# Patient Record
Sex: FEMALE | Race: WHITE | NOT HISPANIC OR LATINO | Employment: FULL TIME | URBAN - METROPOLITAN AREA
[De-identification: names, ages, dates, MRNs, and addresses within clinical notes are randomized per-mention and may not be internally consistent; named-entity substitution may affect disease eponyms.]

---

## 2017-01-04 ENCOUNTER — TELEPHONE (OUTPATIENT)
Dept: FAMILY MEDICINE | Facility: CLINIC | Age: 22
End: 2017-01-04

## 2017-01-04 NOTE — TELEPHONE ENCOUNTER
----- Message from Tonny Funk sent at 1/4/2017  3:03 PM CST -----  Contact: Patient   Patient called to request orders be created for labs - please create orders and call patient at [phone] to schedule.  [place] and [place] labs to return to work

## 2017-01-06 ENCOUNTER — OFFICE VISIT (OUTPATIENT)
Dept: FAMILY MEDICINE | Facility: CLINIC | Age: 22
End: 2017-01-06
Payer: COMMERCIAL

## 2017-01-06 ENCOUNTER — LAB VISIT (OUTPATIENT)
Dept: LAB | Facility: HOSPITAL | Age: 22
End: 2017-01-06
Attending: NURSE PRACTITIONER
Payer: COMMERCIAL

## 2017-01-06 VITALS
TEMPERATURE: 98 F | HEIGHT: 69 IN | BODY MASS INDEX: 28.9 KG/M2 | HEART RATE: 74 BPM | SYSTOLIC BLOOD PRESSURE: 126 MMHG | WEIGHT: 195.13 LBS | DIASTOLIC BLOOD PRESSURE: 70 MMHG

## 2017-01-06 DIAGNOSIS — Z00.00 ROUTINE GENERAL MEDICAL EXAMINATION AT A HEALTH CARE FACILITY: ICD-10-CM

## 2017-01-06 DIAGNOSIS — Z00.00 ROUTINE GENERAL MEDICAL EXAMINATION AT A HEALTH CARE FACILITY: Primary | ICD-10-CM

## 2017-01-06 LAB
BASOPHILS # BLD AUTO: 0.02 K/UL
BASOPHILS NFR BLD: 0.2 %
DIFFERENTIAL METHOD: ABNORMAL
EOSINOPHIL # BLD AUTO: 0 K/UL
EOSINOPHIL NFR BLD: 0.5 %
ERYTHROCYTE [DISTWIDTH] IN BLOOD BY AUTOMATED COUNT: 14.2 %
HCT VFR BLD AUTO: 40.3 %
HGB BLD-MCNC: 13 G/DL
LYMPHOCYTES # BLD AUTO: 2.2 K/UL
LYMPHOCYTES NFR BLD: 26.7 %
MCH RBC QN AUTO: 24.8 PG
MCHC RBC AUTO-ENTMCNC: 32.3 %
MCV RBC AUTO: 77 FL
MONOCYTES # BLD AUTO: 0.7 K/UL
MONOCYTES NFR BLD: 8.7 %
NEUTROPHILS # BLD AUTO: 5.3 K/UL
NEUTROPHILS NFR BLD: 63.8 %
PLATELET # BLD AUTO: 320 K/UL
PMV BLD AUTO: 10.3 FL
RBC # BLD AUTO: 5.25 M/UL
WBC # BLD AUTO: 8.27 K/UL

## 2017-01-06 PROCEDURE — 1159F MED LIST DOCD IN RCRD: CPT | Mod: S$GLB,,, | Performed by: NURSE PRACTITIONER

## 2017-01-06 PROCEDURE — 99999 PR PBB SHADOW E&M-EST. PATIENT-LVL III: CPT | Mod: PBBFAC,,, | Performed by: NURSE PRACTITIONER

## 2017-01-06 PROCEDURE — 99214 OFFICE O/P EST MOD 30 MIN: CPT | Mod: S$GLB,,, | Performed by: NURSE PRACTITIONER

## 2017-01-06 PROCEDURE — 36415 COLL VENOUS BLD VENIPUNCTURE: CPT | Mod: PO

## 2017-01-06 PROCEDURE — 85025 COMPLETE CBC W/AUTO DIFF WBC: CPT

## 2017-01-06 RX ORDER — SULFAMETHOXAZOLE AND TRIMETHOPRIM 800; 160 MG/1; MG/1
TABLET ORAL
Refills: 0 | COMMUNITY
Start: 2016-11-02 | End: 2017-01-06

## 2017-01-06 RX ORDER — METRONIDAZOLE 500 MG/1
TABLET ORAL
Refills: 0 | COMMUNITY
Start: 2016-11-02 | End: 2017-01-06

## 2017-01-06 NOTE — PROGRESS NOTES
Subjective:       Patient ID: Claudia Schaefer is a 21 y.o. female.    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 Was out of country xs 10 days, has been back since Wednesday. No c/o today.    Vitals:    01/06/17 1327   BP: 126/70   Pulse: 74   Temp: 98 °F (36.7 °C)     Review of Systems   Constitutional: Negative for chills, diaphoresis and fever.   HENT: Positive for rhinorrhea. Negative for ear discharge, ear pain, facial swelling, postnasal drip, sinus pressure, sore throat and trouble swallowing.    Eyes: Negative for pain, discharge, redness and itching.   Respiratory: Positive for cough (mild, non productive cough). Negative for shortness of breath and wheezing.    Cardiovascular: Negative for chest pain, palpitations and leg swelling.   Gastrointestinal: Negative for abdominal pain, blood in stool, diarrhea, nausea and vomiting.   Endocrine: Negative for cold intolerance, heat intolerance, polydipsia and polyuria.        Has a swollen lymph node to left cervical area, unchanged, has had a previous biopsy was WNL at time of biopsy, dx with enlarged lymph node   Genitourinary: Negative for dysuria, frequency, genital sores, hematuria, pelvic pain, vaginal bleeding, vaginal discharge and vaginal pain.   Musculoskeletal: Negative.  Negative for arthralgias.   Skin: Negative for color change and rash.   Neurological: Negative for dizziness, tremors, speech difficulty, weakness and light-headedness.       Past Medical History   Diagnosis Date    Allergy     Anxiety     Former smoker     Otitis media      Objective:      Physical Exam   Constitutional: She is oriented to person, place, and time. Vital signs are normal. She appears well-developed and well-nourished. She is active and cooperative. She does not have a sickly appearance. She does not appear ill.   HENT:   Head: Normocephalic.    Right Ear: Tympanic membrane, external ear and ear canal normal.   Left Ear: Tympanic membrane, external ear and ear canal normal.   Nose: Mucosal edema and rhinorrhea present. Right sinus exhibits no maxillary sinus tenderness and no frontal sinus tenderness. Left sinus exhibits no maxillary sinus tenderness and no frontal sinus tenderness.   Mouth/Throat: Oropharynx is clear and moist and mucous membranes are normal.   Eyes: Conjunctivae and EOM are normal. Pupils are equal, round, and reactive to light.   Neck: Neck supple. Carotid bruit is not present. No thyroid mass and no thyromegaly present.   Cardiovascular: Normal rate, regular rhythm, S1 normal, S2 normal, normal heart sounds and intact distal pulses.    No murmur heard.  Pulses:       Dorsalis pedis pulses are 2+ on the right side, and 2+ on the left side.        Posterior tibial pulses are 2+ on the right side, and 2+ on the left side.   No edema oz   Pulmonary/Chest: Effort normal and breath sounds normal.   Abdominal: Soft. Bowel sounds are normal. She exhibits no abdominal bruit. There is no hepatosplenomegaly. There is no tenderness.   Musculoskeletal: Normal range of motion.   Lymphadenopathy:     She has no cervical adenopathy.     She has no axillary adenopathy.        Right: No inguinal and no supraclavicular adenopathy present.        Left: No inguinal and no supraclavicular adenopathy present.   Neurological: She is alert and oriented to person, place, and time.   Skin: Skin is warm, dry and intact.   Psychiatric: She has a normal mood and affect.   Nursing note and vitals reviewed.      Assessment:       1. Routine general medical examination at a health care facility        Plan:       Routine general medical examination at a health care facility  -     CBC auto differential; Future; Expected date: 1/6/17        will have her do CBC today, if WNL, will write note for her to return to work, if any concerning sxs develop RTC    Return if  symptoms worsen or fail to improve, for pending test results.

## 2017-01-06 NOTE — MR AVS SNAPSHOT
[facility]  [address] [facility] [address]  [place] [address]  Phone: [phone]  Fax: [phone]                  Claudia Schaefer   2017 1:20 PM   Office Visit    Description:  Female : 1995   Provider:  YUSRA Pate   Department:  [facility]           Reason for Visit     clearance to return to work           Diagnoses this Visit        Comments    Routine general medical examination at a health care facility    -  Primary            To Do List           Goals (5 Years of Data)     None      Follow-Up and Disposition     Return if symptoms worsen or fail to improve, for pending test results.      [facility] On Call     [facility] On Call Nurse Care Line -  Assistance  Registered nurses in the [facility] On Call Center provide clinical advisement, health education, appointment booking, and other advisory services.  Call for this free service at [phone].             Medications           Message regarding Medications     Verify the changes and/or additions to your medication regime listed below are the same as discussed with your clinician today.  If any of these changes or additions are incorrect, please notify your healthcare provider.        STOP taking these medications     esomeprazole (NEXIUM) 40 MG capsule Take 1 capsule (40 mg total) by mouth before breakfast.    metronidazole (FLAGYL) 500 MG tablet TK 1 T PO TWICE DAILY. DO NOT CONSUME ALCOHOL WHILE TAKING MEDICATION    sulfamethoxazole-trimethoprim 800-160mg (BACTRIM DS) 800-160 mg Tab TK 1 T PO BID           Verify that the below list of medications is an accurate representation of the medications you are currently taking.  If none reported, the list may be blank. If incorrect, please contact your healthcare provider. Carry this list with you in case of emergency.           Current Medications     escitalopram oxalate (LEXAPRO) 10 MG tablet Take 10 mg by mouth every evening. As needed for stress     "cholecalciferol, vitamin D3, 2,000 unit Cap            Clinical Reference Information           Vital Signs - Last Recorded  Most recent update: 1/6/2017  1:29 PM by Rufina Rosenthal MA    BP Pulse Temp Ht Wt BMI    126/70 (BP Location: Right arm, Patient Position: Sitting, BP Method: Manual) 74 98 °F (36.7 °C) (Oral) 5' 9" (1.753 m) 88.5 kg (195 lb 1.7 oz) 28.81 kg/m2      Blood Pressure          Most Recent Value    BP  126/70      Allergies as of 1/6/2017     No Known Allergies      Immunizations Administered on Date of Encounter - 1/6/2017     None      Orders Placed During Today's Visit     Future Labs/Procedures Expected by Expires    CBC auto differential  1/6/2017 3/7/2018      "

## 2017-03-24 ENCOUNTER — PATIENT MESSAGE (OUTPATIENT)
Dept: FAMILY MEDICINE | Facility: CLINIC | Age: 22
End: 2017-03-24

## 2018-01-04 ENCOUNTER — TELEPHONE (OUTPATIENT)
Dept: OTOLARYNGOLOGY | Facility: CLINIC | Age: 23
End: 2018-01-04

## 2018-01-04 NOTE — TELEPHONE ENCOUNTER
----- Message from Maryuri Davis sent at 1/4/2018  9:38 AM CST -----  Contact: Patient  Claudia, patient [phone] calling for same day appointment, patient believes she has tonsil stones and states it is hard to swallow. Please advise. Thanks.

## 2018-01-05 ENCOUNTER — OFFICE VISIT (OUTPATIENT)
Dept: OTOLARYNGOLOGY | Facility: CLINIC | Age: 23
End: 2018-01-05
Payer: COMMERCIAL

## 2018-01-05 VITALS
HEART RATE: 72 BPM | SYSTOLIC BLOOD PRESSURE: 114 MMHG | BODY MASS INDEX: 28.16 KG/M2 | HEIGHT: 67 IN | DIASTOLIC BLOOD PRESSURE: 71 MMHG | WEIGHT: 179.44 LBS

## 2018-01-05 DIAGNOSIS — J03.91 RECURRENT TONSILLITIS: ICD-10-CM

## 2018-01-05 DIAGNOSIS — J35.8 CRYPTIC TONSIL: Primary | ICD-10-CM

## 2018-01-05 DIAGNOSIS — J35.8 TONSILLOLITH: ICD-10-CM

## 2018-01-05 PROCEDURE — 99213 OFFICE O/P EST LOW 20 MIN: CPT | Mod: S$GLB,,, | Performed by: NURSE PRACTITIONER

## 2018-01-05 PROCEDURE — 99999 PR PBB SHADOW E&M-EST. PATIENT-LVL III: CPT | Mod: PBBFAC,,, | Performed by: NURSE PRACTITIONER

## 2018-01-05 RX ORDER — NORETHINDRONE ACETATE AND ETHINYL ESTRADIOL .5; 2.5 MG/1; UG/1
1 TABLET ORAL DAILY
COMMUNITY
End: 2018-02-20

## 2018-01-05 NOTE — PROGRESS NOTES
Subjective:       Patient ID: Claudia Schaefer is a 22 y.o. female.    Chief Complaint: tonsil stones (x 3 months); Sore Throat; and Dysphagia    HPI   Patient states she has had 2 episodes of strep/tonsillitis in the past year, both treated at urgent care.  She reports history of tonsil stones.  She reports dysphagia and globus sensation X 3 weeks. She has a swollen gland left neck X 1 year.     Review of Systems   Constitutional: Negative.    HENT: Positive for sore throat and trouble swallowing.    Eyes: Negative.    Respiratory: Negative.    Cardiovascular: Negative.    Gastrointestinal: Negative.    Musculoskeletal: Negative.    Skin: Negative.    Neurological: Negative.    Hematological: Negative.    Psychiatric/Behavioral: Negative.        Objective:      Physical Exam   Constitutional: She is oriented to person, place, and time. Vital signs are normal. She appears well-developed and well-nourished. She is cooperative. She does not appear ill. No distress.   HENT:   Head: Normocephalic and atraumatic.   Right Ear: Hearing, tympanic membrane, external ear and ear canal normal. Tympanic membrane is not erythematous. No middle ear effusion.   Left Ear: Hearing, tympanic membrane, external ear and ear canal normal. Tympanic membrane is not erythematous.  No middle ear effusion.   Nose: Nose normal. No mucosal edema or rhinorrhea.   Mouth/Throat: Uvula is midline, oropharynx is clear and moist and mucous membranes are normal. Mucous membranes are not pale, not dry and not cyanotic. No oral lesions. No oropharyngeal exudate, posterior oropharyngeal edema or posterior oropharyngeal erythema. Tonsils are 2+ on the right. Tonsils are 2+ on the left. No tonsillar exudate.   Eyes: Conjunctivae, EOM and lids are normal. Pupils are equal, round, and reactive to light. Right eye exhibits no discharge. Left eye exhibits no discharge. No scleral icterus.   Neck: Trachea normal and normal range of motion. Neck supple. No  tracheal deviation present. No thyroid mass and no thyromegaly present.   Cardiovascular: Normal rate.    Pulmonary/Chest: Effort normal. No stridor. No respiratory distress. She has no wheezes.   Musculoskeletal: Normal range of motion.   Lymphadenopathy:        Head (right side): No submental, no submandibular, no tonsillar, no preauricular and no posterior auricular adenopathy present.        Head (left side): No submental, no submandibular, no tonsillar, no preauricular and no posterior auricular adenopathy present.     She has no cervical adenopathy.        Right cervical: No superficial cervical and no posterior cervical adenopathy present.       Left cervical: No superficial cervical and no posterior cervical adenopathy present.   Neurological: She is alert and oriented to person, place, and time. She has normal strength. Coordination and gait normal.   Skin: Skin is warm, dry and intact. No lesion and no rash noted. She is not diaphoretic. No cyanosis. No pallor.   Psychiatric: She has a normal mood and affect. Her speech is normal and behavior is normal. Judgment and thought content normal. Cognition and memory are normal.   Nursing note and vitals reviewed.      Assessment:     Cryptic tonsils with h/o Tonsilloliths      Plan:     Discussed criteria for tonsillectomy. Discussed measures for tonsilloliths.     Patient encouraged to return to ENT for further episodes of acute tonsillitis, or any other ENT symptoms or concerns

## 2018-01-24 PROBLEM — J35.03 TONSILLITIS AND ADENOIDITIS, CHRONIC: Status: ACTIVE | Noted: 2018-01-24

## 2018-01-24 PROBLEM — J35.3 HYPERPLASIA OF TONSILS AND ADENOIDS: Status: ACTIVE | Noted: 2018-01-24

## 2018-04-20 PROBLEM — O00.80 OTHER ECTOPIC PREGNANCY WITHOUT INTRAUTERINE PREGNANCY: Status: ACTIVE | Noted: 2018-04-20

## 2019-08-16 LAB — PAP SMEAR: NORMAL

## 2019-09-23 ENCOUNTER — TELEPHONE (OUTPATIENT)
Dept: NEUROLOGY | Facility: CLINIC | Age: 24
End: 2019-09-23

## 2019-09-23 NOTE — TELEPHONE ENCOUNTER
Spoke with pt and informed her Dr. Dumas will not be seeing patients in clinic after this month and of no available appts at this time. Informed a referral is needed to be seen by providers. Pt upset that a referral is needed and will check elsewhere. Verbalized understanding.

## 2019-09-23 NOTE — TELEPHONE ENCOUNTER
----- Message from Elvia Rutledge sent at 9/23/2019 10:40 AM CDT -----  Contact: self  Type:  Same Day Appointment Request    Patient request Dr Dumas states has Chiari need appointnment    Name of Caller:   Patient   When is the first available appointment?  Symptoms:  Best Call Back Number:[phone]  Additional Information:  I was not sure of appointment

## 2020-01-09 ENCOUNTER — OFFICE VISIT (OUTPATIENT)
Dept: FAMILY MEDICINE | Facility: CLINIC | Age: 25
End: 2020-01-09
Payer: COMMERCIAL

## 2020-01-09 ENCOUNTER — TELEPHONE (OUTPATIENT)
Dept: ADMINISTRATIVE | Facility: HOSPITAL | Age: 25
End: 2020-01-09

## 2020-01-09 ENCOUNTER — LAB VISIT (OUTPATIENT)
Dept: LAB | Facility: HOSPITAL | Age: 25
End: 2020-01-09
Attending: FAMILY MEDICINE
Payer: COMMERCIAL

## 2020-01-09 VITALS
SYSTOLIC BLOOD PRESSURE: 104 MMHG | HEART RATE: 71 BPM | DIASTOLIC BLOOD PRESSURE: 62 MMHG | HEIGHT: 67 IN | WEIGHT: 159.63 LBS | OXYGEN SATURATION: 97 % | TEMPERATURE: 98 F | BODY MASS INDEX: 25.06 KG/M2

## 2020-01-09 DIAGNOSIS — K21.9 GASTROESOPHAGEAL REFLUX DISEASE WITHOUT ESOPHAGITIS: ICD-10-CM

## 2020-01-09 DIAGNOSIS — R53.83 OTHER FATIGUE: ICD-10-CM

## 2020-01-09 DIAGNOSIS — Z00.01 ENCOUNTER FOR ROUTINE ADULT HEALTH EXAMINATION WITH ABNORMAL FINDINGS: ICD-10-CM

## 2020-01-09 DIAGNOSIS — Z00.01 ENCOUNTER FOR ROUTINE ADULT HEALTH EXAMINATION WITH ABNORMAL FINDINGS: Primary | ICD-10-CM

## 2020-01-09 DIAGNOSIS — R01.1 HEART MURMUR: ICD-10-CM

## 2020-01-09 LAB
ALBUMIN SERPL BCP-MCNC: 4.1 G/DL (ref 3.5–5.2)
ALP SERPL-CCNC: 69 U/L (ref 55–135)
ALT SERPL W/O P-5'-P-CCNC: 10 U/L (ref 10–44)
ANION GAP SERPL CALC-SCNC: 7 MMOL/L (ref 8–16)
AST SERPL-CCNC: 16 U/L (ref 10–40)
BASOPHILS # BLD AUTO: 0.02 K/UL (ref 0–0.2)
BASOPHILS NFR BLD: 0.5 % (ref 0–1.9)
BILIRUB SERPL-MCNC: 0.8 MG/DL (ref 0.1–1)
BUN SERPL-MCNC: 11 MG/DL (ref 6–20)
CALCIUM SERPL-MCNC: 9.4 MG/DL (ref 8.7–10.5)
CHLORIDE SERPL-SCNC: 107 MMOL/L (ref 95–110)
CHOLEST SERPL-MCNC: 150 MG/DL (ref 120–199)
CHOLEST/HDLC SERPL: 2.3 {RATIO} (ref 2–5)
CO2 SERPL-SCNC: 27 MMOL/L (ref 23–29)
CREAT SERPL-MCNC: 0.7 MG/DL (ref 0.5–1.4)
DIFFERENTIAL METHOD: ABNORMAL
EOSINOPHIL # BLD AUTO: 0 K/UL (ref 0–0.5)
EOSINOPHIL NFR BLD: 0.5 % (ref 0–8)
ERYTHROCYTE [DISTWIDTH] IN BLOOD BY AUTOMATED COUNT: 14 % (ref 11.5–14.5)
EST. GFR  (AFRICAN AMERICAN): >60 ML/MIN/1.73 M^2
EST. GFR  (NON AFRICAN AMERICAN): >60 ML/MIN/1.73 M^2
ESTIMATED AVG GLUCOSE: 94 MG/DL (ref 68–131)
GLUCOSE SERPL-MCNC: 73 MG/DL (ref 70–110)
HBA1C MFR BLD HPLC: 4.9 % (ref 4–5.6)
HCT VFR BLD AUTO: 41.6 % (ref 37–48.5)
HDLC SERPL-MCNC: 65 MG/DL (ref 40–75)
HDLC SERPL: 43.3 % (ref 20–50)
HGB BLD-MCNC: 13.3 G/DL (ref 12–16)
IMM GRANULOCYTES # BLD AUTO: 0 K/UL (ref 0–0.04)
IMM GRANULOCYTES NFR BLD AUTO: 0 % (ref 0–0.5)
LDLC SERPL CALC-MCNC: 65.4 MG/DL (ref 63–159)
LYMPHOCYTES # BLD AUTO: 0.7 K/UL (ref 1–4.8)
LYMPHOCYTES NFR BLD: 18.4 % (ref 18–48)
MCH RBC QN AUTO: 27.2 PG (ref 27–31)
MCHC RBC AUTO-ENTMCNC: 32 G/DL (ref 32–36)
MCV RBC AUTO: 85 FL (ref 82–98)
MONOCYTES # BLD AUTO: 0.5 K/UL (ref 0.3–1)
MONOCYTES NFR BLD: 13.6 % (ref 4–15)
NEUTROPHILS # BLD AUTO: 2.5 K/UL (ref 1.8–7.7)
NEUTROPHILS NFR BLD: 67 % (ref 38–73)
NONHDLC SERPL-MCNC: 85 MG/DL
NRBC BLD-RTO: 0 /100 WBC
PLATELET # BLD AUTO: 238 K/UL (ref 150–350)
PMV BLD AUTO: 11 FL (ref 9.2–12.9)
POTASSIUM SERPL-SCNC: 3.6 MMOL/L (ref 3.5–5.1)
PROT SERPL-MCNC: 7.4 G/DL (ref 6–8.4)
RBC # BLD AUTO: 4.89 M/UL (ref 4–5.4)
SODIUM SERPL-SCNC: 141 MMOL/L (ref 136–145)
TRIGL SERPL-MCNC: 98 MG/DL (ref 30–150)
TSH SERPL DL<=0.005 MIU/L-ACNC: 1.33 UIU/ML (ref 0.4–4)
WBC # BLD AUTO: 3.75 K/UL (ref 3.9–12.7)

## 2020-01-09 PROCEDURE — 83036 HEMOGLOBIN GLYCOSYLATED A1C: CPT

## 2020-01-09 PROCEDURE — 99385 PR PREVENTIVE VISIT,NEW,18-39: ICD-10-PCS | Mod: S$GLB,,, | Performed by: FAMILY MEDICINE

## 2020-01-09 PROCEDURE — 99999 PR PBB SHADOW E&M-EST. PATIENT-LVL IV: ICD-10-PCS | Mod: PBBFAC,,, | Performed by: FAMILY MEDICINE

## 2020-01-09 PROCEDURE — 85025 COMPLETE CBC W/AUTO DIFF WBC: CPT

## 2020-01-09 PROCEDURE — 36415 COLL VENOUS BLD VENIPUNCTURE: CPT | Mod: PO

## 2020-01-09 PROCEDURE — 84443 ASSAY THYROID STIM HORMONE: CPT

## 2020-01-09 PROCEDURE — 99385 PREV VISIT NEW AGE 18-39: CPT | Mod: S$GLB,,, | Performed by: FAMILY MEDICINE

## 2020-01-09 PROCEDURE — 99999 PR PBB SHADOW E&M-EST. PATIENT-LVL IV: CPT | Mod: PBBFAC,,, | Performed by: FAMILY MEDICINE

## 2020-01-09 PROCEDURE — 80061 LIPID PANEL: CPT

## 2020-01-09 PROCEDURE — 80053 COMPREHEN METABOLIC PANEL: CPT

## 2020-01-09 RX ORDER — BUTALBITAL, ACETAMINOPHEN AND CAFFEINE 50; 325; 40 MG/1; MG/1; MG/1
1 TABLET ORAL EVERY 8 HOURS PRN
COMMUNITY
Start: 2019-08-01 | End: 2020-03-20 | Stop reason: SDUPTHER

## 2020-01-09 RX ORDER — PANTOPRAZOLE SODIUM 20 MG/1
20 TABLET, DELAYED RELEASE ORAL DAILY
Qty: 30 TABLET | Refills: 1 | Status: SHIPPED | OUTPATIENT
Start: 2020-01-09 | End: 2020-02-20

## 2020-01-09 RX ORDER — DEXTROAMPHETAMINE SACCHARATE, AMPHETAMINE ASPARTATE, DEXTROAMPHETAMINE SULFATE AND AMPHETAMINE SULFATE 5; 5; 5; 5 MG/1; MG/1; MG/1; MG/1
TABLET ORAL
Status: ON HOLD | COMMUNITY
Start: 2017-10-20 | End: 2021-03-11

## 2020-01-09 NOTE — Clinical Note
Patient had a pap smear with Dr. Alicea and had a pap and chlamydia check. To take out the diabetes as patient is not.

## 2020-01-09 NOTE — TELEPHONE ENCOUNTER
----- Message from Shonda Msaon MD sent at 1/9/2020  8:18 AM CST -----  Patient had a pap smear with Dr. Alicea and had a pap and chlamydia check. To take out the diabetes as patient is not.

## 2020-01-09 NOTE — LETTER
AUTHORIZATION FOR RELEASE OF   CONFIDENTIAL INFORMATION    Dear Dr Alicea,    We are seeing Claudia Sloan, date of birth 1995, in the clinic at [facility]. Shonda Mason MD is the patient's PCP. Claudia Sloan has an outstanding lab/procedure at the time we reviewed her chart. In order to help keep her health information updated, she has authorized us to request the following medical record(s):        ( X )  PAP SMEAR                                                       Please fax records to [facility], Ruth G Darg, MD, [phone]     If you have any questions, please contact Dalila Barrera LPN Clinical Care Coordinator at [phone]           Patient Name: Claudia Sloan  : 1995  Patient Phone #: [phone]

## 2020-01-09 NOTE — PATIENT INSTRUCTIONS
Eating Heart-Healthy Food: Using the DASH Plan    Eating for your heart doesnt have to be hard or boring. You just need to know how to make healthier choices. The DASH eating plan has been developed to help you do just that. DASH stands for Dietary Approaches to Stop Hypertension. It is a plan that has been proven to be healthier for your heart and to lower your risk for high blood pressure. It can also help lower your risk for cancer, heart disease, osteoporosis, and diabetes.  Choosing from each food group  Choose foods from each of the food groups below each day. Try to get the recommended number of servings for each food group. The serving numbers are based on a diet of 2,000 calories a day. Talk to your doctor if youre unsure about your calorie needs. Along with getting the correct servings, the DASH plan also recommends a sodium intake less than 2,300 mg per day.        Grains  Servings: 6 to 8 a day  A serving is:  · 1 slice bread  · 1 ounce dry cereal  · Half a cup cooked rice, pasta or cereal  Best choices: Whole grains and any grains high in fiber. Vegetables  Servings: 4 to 5 a day  A serving is:  · 1 cup raw leafy vegetable  · Half a cup cut-up raw or cooked vegetable  · Half a cup vegetable juice  Best choices: Fresh or frozen vegetables prepared without added salt or fat.   Fruits  Servings: 4 to 5 a day  A serving is:  · 1 medium fruit  · One-quarter cup dried fruit  · Half a cup fresh, frozen, or canned fruit  · Half a cup of 100% fruit juices  Best choices: A variety of fresh fruits of different colors. Whole fruits are a better choice than fruit juices. Low-fat or fat-free dairy  Servings: 2 to 3 a day  A serving is:  · 1 cup milk  · 1 cup yogurt  · One and a half ounces cheese  Best choices: Skim or 1% milk, low-fat or fat-free yogurt or buttermilk, and low-fat cheeses.         Lean meats, poultry, fish  Servings: 6 or fewer a day  A serving is:  · 1 ounce cooked meats, poultry, or fish  · 1  egg  Best choices: Lean poultry and fish. Trim away visible fat. Broil, grill, roast, or boil instead of frying. Remove skin from poultry before eating. Limit how much red meat you eat.  Nuts, seeds, beans  Servings: 4 to 5 a week  A serving is:  · One-third cup nuts (one and a half ounces)  · 2 tablespoons nut butter or seeds  · Half a cup cooked dry beans or legumes  Best choices: Dry roasted nuts with no salt added, lentils, kidney beans, garbanzo beans, and whole zapata beans.   Fats and oils  Servings: 2 to 3 a day  A serving is:  · 1 teaspoon vegetable oil  · 1 teaspoon soft margarine  · 1 tablespoon mayonnaise  · 2 tablespoons salad dressing  Best choices: Nut and vegetable oils (nontropical vegetable oils), such as olive and canola oil. Sweets  Servings: 5 a week or fewer  A serving is:  · 1 tablespoon sugar, maple syrup, or honey  · 1 tablespoon jam or jelly  · 1 half-ounce jelly beans (about 15)  · 1 cup lemonade  Best choices: Dried fruit can be a satisfying sweet. Choose low-fat sweets. And watch your serving sizes!      For more on the DASH eating plan, visit:  www.nhlbi.nih.gov/health/health-topics/topics/dash   Date Last Reviewed: 6/1/2016  © 8792-1602 [employer]. [address] [place] [address], [place] [address]. All rights reserved. This information is not intended as a substitute for professional medical care. Always follow your healthcare professional's instructions.        GERD (Adult)    The esophagus is a tube that carries food from the mouth to the stomach. A valve at the lower end of the esophagus prevents stomach acid from flowing upward. When this valve doesn't work properly, stomach contents may repeatedly flow back up (reflux) into the esophagus. This is called gastroesophageal reflux disease (GERD). GERD can irritate the esophagus. It can cause problems with swallowing or breathing. In severe cases, GERD can cause recurrent pneumonia or other serious problems.  Symptoms of reflux  "include burning, pressure or sharp pain in the upper abdomen or mid to lower chest. The pain can spread to the neck, back, or shoulder. There may be belching, an acid taste in the back of the throat, chronic cough, or sore throat or hoarseness. GERD symptoms often occur during the day after a big meal. They can also occur at night when lying down.   Home care  Lifestyle changes can help reduce symptoms. If needed, medicines may be prescribed. Symptoms often improve with treatment, but if treatment is stopped, the symptoms often return after a few months. So most persons with GERD will need to continue treatment.  Lifestyle changes  · Limit or avoid fatty, fried, and spicy foods, as well as coffee, chocolate, mint, and foods with high acid content such as tomatoes and citrus fruit and juices (orange, grapefruit, lemon).  · Dont eat large meals, especially at night. Frequent, smaller meals are best. Do not lie down right after eating. And dont eat anything 3 hours before going to bed.  · Avoid drinking alcohol and smoking. As much as possible, stay away from second hand smoke.  · If you are overweight, losing weight will reduce symptoms.   · Avoid wearing tight clothing around your stomach area.  · If your symptoms occur during sleep, use a foam wedge to elevate your upper body (not just your head.) Or, place 4" blocks under the head of your bed.  Medicines  If needed, medicines can help relieve the symptoms of GERD and prevent damage to the esophagus. Discuss a medicine plan with your healthcare provider. This may include one or more of the following medicines:  · Antacids to help neutralize the normal acids in your stomach.  · Acid blockers (H2 blockers) to decrease acid production.  · Acid inhibitors (PPIs) to decrease acid production in a different way than the blockers. They may work better, but can take a little longer to take effect.  Take an antacid 30-60 minutes after eating and at bedtime, but not at the " same time as an acid blocker.  Try not to take medicines such as ibuprofen and aspirin. If you are taking aspirin for your heart or other medical reasons, talk to your healthcare provider about stopping it.  Follow-up care  Follow up with your healthcare provider or as advised by our staff.  When to seek medical advice  Call your healthcare provider if any of the following occur:  · Stomach pain gets worse or moves to the lower right abdomen (appendix area)  · Chest pain appears or gets worse, or spreads to the back, neck, shoulder, or arm  · Frequent vomiting (cant keep down liquids)  · Blood in the stool or vomit (red or black in color)  · Feeling weak or dizzy  · Fever of 100.4ºF (38ºC) or higher, or as directed by your healthcare provider  Date Last Reviewed: 6/23/2015  © 1466-2295 [employer]. [address] [place] [address], [place] [address]. All rights reserved. This information is not intended as a substitute for professional medical care. Always follow your healthcare professional's instructions.

## 2020-01-09 NOTE — LETTER
AUTHORIZATION FOR RELEASE OF   CONFIDENTIAL INFORMATION    Dear Dr Alicea,    We are seeing Claudia Sloan, date of birth 1995, in the clinic at [facility]. Shonda Mason MD is the patient's PCP. Claudia Sloan has an outstanding lab/procedure at the time we reviewed her chart. In order to help keep her health information updated, she has authorized us to request the following medical record(s):             ( X )  PAP SMEAR                                                  Please fax records to [facility], Ruth G Darg, [place], [phone]     If you have any questions, please contact Dalila Barrera LPN Clinical Care Coordinator at [phone]           Patient Name: Claudia Sloan  : 1995  Patient Phone #: [phone]

## 2020-01-13 NOTE — PROGRESS NOTES
"Subjective:       Patient ID: Claudia Sloan is a 24 y.o. female.    Chief Complaint:  Annual exam, Establish Care ("extra tired" X3wk) and Chest Pain ("feels like pressure or heartburn on the right side"x5d)    HPI     The patient is coming here today for an annual checkup and to establish a new primary care physician.  The patient complains of acid reflux localized on the epigastrium and chest area, the symptoms are getting worse.  The patient is not been taking medications for this problem.  The patient is currently taking butalbital with caffeine for headaches.  She is taking Adderall for ADD.  The patient complains of feeling very tired, the symptoms are getting worse, also complains of being thirsty very often and having some blurred vision.  The patient stated the symptoms are not improving and she is coming here for assessment.  The patient denies any problems with diarrhea, constipation, blood in the stools.    Past medical history, past social history, past Family history, past surgical history was reviewed and discussed with the patient.    Review of Systems   Constitutional: Positive for fatigue. Negative for activity change and unexpected weight change.   HENT: Negative for hearing loss, rhinorrhea and trouble swallowing.    Eyes: Positive for visual disturbance. Negative for pain.   Respiratory: Negative for chest tightness and wheezing.    Cardiovascular: Negative for chest pain and palpitations.   Gastrointestinal: Negative for blood in stool, constipation, diarrhea and vomiting.   Endocrine: Positive for polydipsia. Negative for polyuria.   Genitourinary: Negative for difficulty urinating, dysuria, hematuria and menstrual problem.   Musculoskeletal: Negative for arthralgias and joint swelling.   Skin: Negative for color change and pallor.   Neurological: Positive for headaches. Negative for weakness.   Psychiatric/Behavioral: Negative for confusion and dysphoric mood.       Objective:      Physical " Exam   Constitutional: She appears well-developed and well-nourished. No distress.   HENT:   Head: Normocephalic and atraumatic.   Right Ear: External ear normal.   Left Ear: External ear normal.   Nose: Nose normal.   Mouth/Throat: Oropharynx is clear and moist. No oropharyngeal exudate.   Eyes: Pupils are equal, round, and reactive to light. Conjunctivae are normal. Right eye exhibits no discharge. Left eye exhibits no discharge. No scleral icterus.   Neck: Neck supple. No thyromegaly present.   Cardiovascular: Normal rate and regular rhythm.   Murmur heard.  Pulmonary/Chest: Effort normal and breath sounds normal. No respiratory distress. She has no wheezes.   Abdominal: She exhibits no distension. There is no tenderness.   Musculoskeletal: She exhibits no tenderness or deformity.   Neurological: No cranial nerve deficit. Coordination normal.   Skin: No rash noted. She is not diaphoretic. No erythema. No pallor.   Psychiatric: She has a normal mood and affect. Her behavior is normal. Judgment and thought content normal.   Nursing note and vitals reviewed.      Assessment:       1. Encounter for routine adult health examination with abnormal findings    2. Other fatigue    3. Gastroesophageal reflux disease without esophagitis    4. Heart murmur        Plan:       Encounter for routine adult health examination with abnormal findings  -     CBC auto differential; Future; Expected date: 01/09/2020  -     Comprehensive metabolic panel; Future; Expected date: 01/09/2020  -     Lipid panel; Future; Expected date: 01/09/2020  -     TSH; Future; Expected date: 01/09/2020  -     Hemoglobin A1c; Future; Expected date: 01/09/2020  -     Echo Color Flow Doppler? Yes; Future    Other fatigue:  New problem workup needed    Gastroesophageal reflux disease without esophagitis:  New problem, next visit workup  -     pantoprazole (PROTONIX) 20 MG tablet; Take 1 tablet (20 mg total) by mouth once daily.  Dispense: 30 tablet;  Refill: 1    Heart murmur, new problem workup needed  -     Echo Color Flow Doppler? Yes; Future    Will call the patient after we have the results of the test, for as reflux will prescribe pantoprazole 20 mg 1 tablet p.o. q.day, will recheck the patient back in 6 weeks.  If the symptoms get worse, the patient will notify us immediately.  Because of heart murmur, will order echocardiogram.  The patient was advised to eat healthy, avoid fried foods, red meat and processed starches, increase physical activity as tolerated, drink plenty water.The patient's BMI has been recorded in the chart. The patient has been provided educational materials regarding the benefits of attaining and maintaining a normal weight. We will continue to address and follow this issue during follow up visits.Patient agreed with assessment and plan. Patient verbalized understanding.

## 2020-01-22 ENCOUNTER — CLINICAL SUPPORT (OUTPATIENT)
Dept: CARDIOLOGY | Facility: CLINIC | Age: 25
End: 2020-01-22
Attending: FAMILY MEDICINE
Payer: COMMERCIAL

## 2020-01-22 VITALS — HEIGHT: 67 IN | BODY MASS INDEX: 24.96 KG/M2 | WEIGHT: 159 LBS

## 2020-01-22 DIAGNOSIS — Z00.01 ENCOUNTER FOR ROUTINE ADULT HEALTH EXAMINATION WITH ABNORMAL FINDINGS: ICD-10-CM

## 2020-01-22 DIAGNOSIS — R01.1 HEART MURMUR: ICD-10-CM

## 2020-01-22 LAB
AV INDEX (PROSTH): 0.75
AV MEAN GRADIENT: 4 MMHG
AV PEAK GRADIENT: 7 MMHG
AV VALVE AREA: 2.59 CM2
AV VELOCITY RATIO: 0.75
BSA FOR ECHO PROCEDURE: 1.85 M2
CV ECHO LV RWT: 0.27 CM
DOP CALC AO PEAK VEL: 1.28 M/S
DOP CALC AO VTI: 30.98 CM
DOP CALC LVOT AREA: 3.4 CM2
DOP CALC LVOT DIAMETER: 2.09 CM
DOP CALC LVOT PEAK VEL: 0.96 M/S
DOP CALC LVOT STROKE VOLUME: 80.13 CM3
DOP CALCLVOT PEAK VEL VTI: 23.37 CM
E WAVE DECELERATION TIME: 171.46 MSEC
E/A RATIO: 3.25
E/E' RATIO: 5.52 M/S
ECHO LV POSTERIOR WALL: 0.67 CM (ref 0.6–1.1)
FRACTIONAL SHORTENING: 35 % (ref 28–44)
INTERVENTRICULAR SEPTUM: 0.58 CM (ref 0.6–1.1)
LA MAJOR: 3.81 CM
LA MINOR: 3.88 CM
LA WIDTH: 3.31 CM
LEFT ATRIUM SIZE: 3.41 CM
LEFT ATRIUM VOLUME INDEX: 20.1 ML/M2
LEFT ATRIUM VOLUME: 36.89 CM3
LEFT INTERNAL DIMENSION IN SYSTOLE: 3.21 CM (ref 2.1–4)
LEFT VENTRICLE DIASTOLIC VOLUME INDEX: 63.41 ML/M2
LEFT VENTRICLE DIASTOLIC VOLUME: 116.3 ML
LEFT VENTRICLE MASS INDEX: 54 G/M2
LEFT VENTRICLE SYSTOLIC VOLUME INDEX: 22.5 ML/M2
LEFT VENTRICLE SYSTOLIC VOLUME: 41.23 ML
LEFT VENTRICULAR INTERNAL DIMENSION IN DIASTOLE: 4.96 CM (ref 3.5–6)
LEFT VENTRICULAR MASS: 98.33 G
LV LATERAL E/E' RATIO: 4.79 M/S
LV SEPTAL E/E' RATIO: 6.5 M/S
MV PEAK A VEL: 0.28 M/S
MV PEAK E VEL: 0.91 M/S
PISA TR MAX VEL: 2.49 M/S
PULM VEIN S/D RATIO: 0.73
PV PEAK D VEL: 0.85 M/S
PV PEAK S VEL: 0.62 M/S
RA MAJOR: 3.39 CM
RA PRESSURE: 3 MMHG
RA WIDTH: 2.59 CM
RIGHT VENTRICULAR END-DIASTOLIC DIMENSION: 3.13 CM
TDI LATERAL: 0.19 M/S
TDI SEPTAL: 0.14 M/S
TDI: 0.17 M/S
TR MAX PG: 25 MMHG
TRICUSPID ANNULAR PLANE SYSTOLIC EXCURSION: 2.24 CM
TV REST PULMONARY ARTERY PRESSURE: 28 MMHG

## 2020-01-22 PROCEDURE — 99999 PR PBB SHADOW E&M-EST. PATIENT-LVL I: CPT | Mod: PBBFAC,,,

## 2020-01-22 PROCEDURE — 93308 ECHO (CUPID ONLY): ICD-10-PCS | Mod: S$GLB,,, | Performed by: INTERNAL MEDICINE

## 2020-01-22 PROCEDURE — 93321 DOPPLER ECHO F-UP/LMTD STD: CPT | Mod: S$GLB,,, | Performed by: INTERNAL MEDICINE

## 2020-01-22 PROCEDURE — 93308 TTE F-UP OR LMTD: CPT | Mod: S$GLB,,, | Performed by: INTERNAL MEDICINE

## 2020-01-22 PROCEDURE — 93325 DOPPLER ECHO COLOR FLOW MAPG: CPT | Mod: S$GLB,,, | Performed by: INTERNAL MEDICINE

## 2020-01-22 PROCEDURE — 99999 PR PBB SHADOW E&M-EST. PATIENT-LVL I: ICD-10-PCS | Mod: PBBFAC,,,

## 2020-01-22 PROCEDURE — 93325 PR DOPPLER COLOR FLOW VELOCITY MAP: ICD-10-PCS | Mod: S$GLB,,, | Performed by: INTERNAL MEDICINE

## 2020-01-22 PROCEDURE — 93321 PR DOPPLER ECHO HEART,LIMITED,F/U: ICD-10-PCS | Mod: S$GLB,,, | Performed by: INTERNAL MEDICINE

## 2020-01-30 ENCOUNTER — OFFICE VISIT (OUTPATIENT)
Dept: OPTOMETRY | Facility: CLINIC | Age: 25
End: 2020-01-30
Payer: COMMERCIAL

## 2020-01-30 DIAGNOSIS — H52.13 MYOPIA, BILATERAL: ICD-10-CM

## 2020-01-30 DIAGNOSIS — H53.143 ASTHENOPIA, BILATERAL: ICD-10-CM

## 2020-01-30 DIAGNOSIS — R51.9 HEADACHE AROUND THE EYES: ICD-10-CM

## 2020-01-30 DIAGNOSIS — Z01.00 EXAMINATION OF EYES AND VISION: Primary | ICD-10-CM

## 2020-01-30 PROCEDURE — 99999 PR PBB SHADOW E&M-EST. PATIENT-LVL II: CPT | Mod: PBBFAC,,, | Performed by: OPTOMETRIST

## 2020-01-30 PROCEDURE — 99999 PR PBB SHADOW E&M-EST. PATIENT-LVL II: ICD-10-PCS | Mod: PBBFAC,,, | Performed by: OPTOMETRIST

## 2020-01-30 PROCEDURE — 92004 PR EYE EXAM, NEW PATIENT,COMPREHESV: ICD-10-PCS | Mod: S$GLB,,, | Performed by: OPTOMETRIST

## 2020-01-30 PROCEDURE — 92004 COMPRE OPH EXAM NEW PT 1/>: CPT | Mod: S$GLB,,, | Performed by: OPTOMETRIST

## 2020-01-30 PROCEDURE — 92015 PR REFRACTION: ICD-10-PCS | Mod: S$GLB,,, | Performed by: OPTOMETRIST

## 2020-01-30 PROCEDURE — 92015 DETERMINE REFRACTIVE STATE: CPT | Mod: S$GLB,,, | Performed by: OPTOMETRIST

## 2020-01-30 NOTE — PROGRESS NOTES
"HPI     Routine diabetic eye exam--dle-2014    Pt complains of blurred vision at distance. Has noticed some problems   seeing peripherally while driving "for the past couple months." Denies any   eye pain. No flashes or floaters. BSL controlled-lost weight, borderline   diabetic.     Hemoglobin A1C       Date                     Value               Ref Range             Status                01/09/2020               4.9                 4.0 - 5.6 %           Final              Comment:    ADA Screening Guidelines:  5.7-6.4%  Consistent with   prediabetes  >or=6.5%  Consistent with diabetes  High levels of fetal   hemoglobin interfere with the HbA1C  assay. Heterozygous hemoglobin   variants (HbS, HgC, etc)do  not significantly interfere with this assay.     However, presence of multiple variants may affect accuracy.         01/20/2015               5.3                 4.5 - 6.2 %           Final                 08/16/2013               5.7                 4.0 - 6.2 %           Final            ----------       Last edited by Nano Downs on 1/30/2020  8:34 AM. (History)        ROS     Positive for: Eyes    Negative for: Constitutional, Gastrointestinal, Neurological, Skin,   Genitourinary, Musculoskeletal, HENT, Endocrine, Cardiovascular,   Respiratory, Psychiatric, Allergic/Imm, Heme/Lymph    Last edited by DRU Jara, OD on 1/30/2020  8:42 AM. (History)        Assessment /Plan     For exam results, see Encounter Report.    Examination of eyes and vision    Headache around the eyes    Asthenopia, bilateral    Myopia, bilateral      1. Ocular health exam OU  2,3.  Mild HA and eyestrain, madiha night vision uncorrected  4. Updated specs rx, gave copy, wear prn when symptomatic    Discussed and educated patient on current findings /plan.  RTC 1 year, prn if any changes / issues                   "

## 2020-01-30 NOTE — LETTER
January 30, 2020      Shonda Mason MD  [address] [facility] [address] [place] [address]           [facility]  [address] [place] [address]  Phone: [phone]  Fax: [phone]          Patient: Claudia Sloan   MR Number: [id number]   YOB: 1995   Date of Visit: 1/30/2020       Dear Dr. Shonda Mason:    Thank you for referring Claudia Sloan to me for evaluation. Attached you will find relevant portions of my assessment and plan of care.    If you have questions, please do not hesitate to call me. I look forward to following Claudia Sloan along with you.    Sincerely,    DRU Jara, OD    Enclosure  CC:  No Recipients    If you would like to receive this communication electronically, please contact [email or web address] or [phone] to request more information on [facility] Link access.    For providers and/or their staff who would like to refer a patient to [facility], please contact us through our one-stop-shop provider referral line, [facility], at [phone].    If you feel you have received this communication in error or would no longer like to receive these types of communications, please e-mail [email or web address]

## 2020-02-20 ENCOUNTER — OFFICE VISIT (OUTPATIENT)
Dept: FAMILY MEDICINE | Facility: CLINIC | Age: 25
End: 2020-02-20
Payer: COMMERCIAL

## 2020-02-20 VITALS
SYSTOLIC BLOOD PRESSURE: 108 MMHG | WEIGHT: 160.25 LBS | HEART RATE: 67 BPM | DIASTOLIC BLOOD PRESSURE: 66 MMHG | HEIGHT: 67 IN | OXYGEN SATURATION: 98 % | BODY MASS INDEX: 25.15 KG/M2

## 2020-02-20 DIAGNOSIS — G43.809 OTHER MIGRAINE WITHOUT STATUS MIGRAINOSUS, NOT INTRACTABLE: ICD-10-CM

## 2020-02-20 DIAGNOSIS — K21.9 GASTROESOPHAGEAL REFLUX DISEASE WITHOUT ESOPHAGITIS: Primary | ICD-10-CM

## 2020-02-20 PROCEDURE — 99213 PR OFFICE/OUTPT VISIT, EST, LEVL III, 20-29 MIN: ICD-10-PCS | Mod: S$GLB,,, | Performed by: FAMILY MEDICINE

## 2020-02-20 PROCEDURE — 99999 PR PBB SHADOW E&M-EST. PATIENT-LVL III: CPT | Mod: PBBFAC,,, | Performed by: FAMILY MEDICINE

## 2020-02-20 PROCEDURE — 3008F BODY MASS INDEX DOCD: CPT | Mod: CPTII,S$GLB,, | Performed by: FAMILY MEDICINE

## 2020-02-20 PROCEDURE — 99999 PR PBB SHADOW E&M-EST. PATIENT-LVL III: ICD-10-PCS | Mod: PBBFAC,,, | Performed by: FAMILY MEDICINE

## 2020-02-20 PROCEDURE — 99213 OFFICE O/P EST LOW 20 MIN: CPT | Mod: S$GLB,,, | Performed by: FAMILY MEDICINE

## 2020-02-20 PROCEDURE — 3008F PR BODY MASS INDEX (BMI) DOCUMENTED: ICD-10-PCS | Mod: CPTII,S$GLB,, | Performed by: FAMILY MEDICINE

## 2020-02-20 RX ORDER — SUMATRIPTAN SUCCINATE 25 MG/1
TABLET ORAL
Qty: 9 TABLET | Refills: 0 | Status: ON HOLD | OUTPATIENT
Start: 2020-02-20 | End: 2021-03-11

## 2020-02-20 RX ORDER — PANTOPRAZOLE SODIUM 40 MG/1
40 TABLET, DELAYED RELEASE ORAL DAILY
Qty: 30 TABLET | Refills: 0 | Status: ON HOLD | OUTPATIENT
Start: 2020-02-20 | End: 2021-03-11

## 2020-02-20 NOTE — PATIENT INSTRUCTIONS
Eating Heart-Healthy Food: Using the DASH Plan    Eating for your heart doesnt have to be hard or boring. You just need to know how to make healthier choices. The DASH eating plan has been developed to help you do just that. DASH stands for Dietary Approaches to Stop Hypertension. It is a plan that has been proven to be healthier for your heart and to lower your risk for high blood pressure. It can also help lower your risk for cancer, heart disease, osteoporosis, and diabetes.  Choosing from each food group  Choose foods from each of the food groups below each day. Try to get the recommended number of servings for each food group. The serving numbers are based on a diet of 2,000 calories a day. Talk to your doctor if youre unsure about your calorie needs. Along with getting the correct servings, the DASH plan also recommends a sodium intake less than 2,300 mg per day.        Grains  Servings: 6 to 8 a day  A serving is:  · 1 slice bread  · 1 ounce dry cereal  · Half a cup cooked rice, pasta or cereal  Best choices: Whole grains and any grains high in fiber. Vegetables  Servings: 4 to 5 a day  A serving is:  · 1 cup raw leafy vegetable  · Half a cup cut-up raw or cooked vegetable  · Half a cup vegetable juice  Best choices: Fresh or frozen vegetables prepared without added salt or fat.   Fruits  Servings: 4 to 5 a day  A serving is:  · 1 medium fruit  · One-quarter cup dried fruit  · Half a cup fresh, frozen, or canned fruit  · Half a cup of 100% fruit juices  Best choices: A variety of fresh fruits of different colors. Whole fruits are a better choice than fruit juices. Low-fat or fat-free dairy  Servings: 2 to 3 a day  A serving is:  · 1 cup milk  · 1 cup yogurt  · One and a half ounces cheese  Best choices: Skim or 1% milk, low-fat or fat-free yogurt or buttermilk, and low-fat cheeses.         Lean meats, poultry, fish  Servings: 6 or fewer a day  A serving is:  · 1 ounce cooked meats, poultry, or fish  · 1  egg  Best choices: Lean poultry and fish. Trim away visible fat. Broil, grill, roast, or boil instead of frying. Remove skin from poultry before eating. Limit how much red meat you eat.  Nuts, seeds, beans  Servings: 4 to 5 a week  A serving is:  · One-third cup nuts (one and a half ounces)  · 2 tablespoons nut butter or seeds  · Half a cup cooked dry beans or legumes  Best choices: Dry roasted nuts with no salt added, lentils, kidney beans, garbanzo beans, and whole zapata beans.   Fats and oils  Servings: 2 to 3 a day  A serving is:  · 1 teaspoon vegetable oil  · 1 teaspoon soft margarine  · 1 tablespoon mayonnaise  · 2 tablespoons salad dressing  Best choices: Nut and vegetable oils (nontropical vegetable oils), such as olive and canola oil. Sweets  Servings: 5 a week or fewer  A serving is:  · 1 tablespoon sugar, maple syrup, or honey  · 1 tablespoon jam or jelly  · 1 half-ounce jelly beans (about 15)  · 1 cup lemonade  Best choices: Dried fruit can be a satisfying sweet. Choose low-fat sweets. And watch your serving sizes!      For more on the DASH eating plan, visit:  www.nhlbi.nih.gov/health/health-topics/topics/dash   Date Last Reviewed: 6/1/2016  © 5734-6913 [employer]. [address] [place] [address], [place] [address]. All rights reserved. This information is not intended as a substitute for professional medical care. Always follow your healthcare professional's instructions.

## 2020-02-20 NOTE — PROGRESS NOTES
Subjective:       Patient ID: Claudia Sloan is a 24 y.o. female.    Chief Complaint: Follow-up (6wk)    HPI     The patient is coming here today for a follow-up visit.    Acid reflux:  The symptoms are improved some but still having problems.  The patient is still drinking caffeine.  The patient is taking pantoprazole 20 mg daily.  She denies any side effects of the medication.  She denies any abdominal pain at this office visit.    Migraine headaches:  The patient takes butalbital chronically for headaches.  The patient ran out of the medication and she needs a new prescription.  She denies any symptoms of fever, numbness and tingling sensation.  She has been taking over-the-counter medications without improvement of the symptoms.    Past medical history, past social history was reviewed and discussed with the patient.    Review of Systems   Constitutional: Negative for activity change and unexpected weight change.   HENT: Negative for hearing loss, rhinorrhea and trouble swallowing.    Eyes: Negative for discharge and visual disturbance.   Respiratory: Negative for chest tightness and wheezing.    Cardiovascular: Negative for chest pain and palpitations.   Gastrointestinal: Negative for blood in stool, constipation, diarrhea and vomiting.        Acid reflux   Endocrine: Negative for polydipsia and polyuria.   Genitourinary: Negative for difficulty urinating, dysuria, hematuria and menstrual problem.   Musculoskeletal: Negative for arthralgias, joint swelling and neck pain.   Skin: Negative for color change and pallor.   Neurological: Positive for headaches. Negative for weakness.   Psychiatric/Behavioral: Negative for confusion and dysphoric mood.       Objective:      Physical Exam   Constitutional: She appears well-developed and well-nourished. No distress.   HENT:   Head: Normocephalic and atraumatic.   Right Ear: External ear normal.   Left Ear: External ear normal.   Nose: Nose normal.   Mouth/Throat: No  oropharyngeal exudate.   Eyes: Conjunctivae are normal. Right eye exhibits no discharge. Left eye exhibits no discharge. No scleral icterus.   Musculoskeletal: She exhibits no tenderness or deformity.   Neurological: She displays normal reflexes. No cranial nerve deficit. Coordination normal.   Skin: No rash noted. She is not diaphoretic. No erythema. No pallor.   Psychiatric: She has a normal mood and affect. Her behavior is normal. Judgment and thought content normal.   Nursing note and vitals reviewed.      Assessment:       1. Gastroesophageal reflux disease without esophagitis    2. Other migraine without status migrainosus, not intractable        Plan:       Gastroesophageal reflux disease without esophagitis:  Improved  -     pantoprazole (PROTONIX) 40 MG tablet; Take 1 tablet (40 mg total) by mouth once daily.  Dispense: 30 tablet; Refill: 0    Other migraine without status migrainosus, not intractable:  Worsening  -     sumatriptan (IMITREX) 25 MG Tab; Take one tablet by mouth for 1 dose, can repeat in 2 hours if symptoms persist  Dispense: 9 tablet; Refill: 0      Will increase the dosage of the pantoprazole to 40 mg 1 tablet p.o. q.day instead of 20 mg for another month.  I recommend the patient that taking butalbital chronically can create addiction and tolerance to the medicine, I will start patient on Imitrex 25 mg as needed.  Any side effects of the medication, the patient will notify us immediately.  The patient was strongly advised to quit caffeine.  She was also advised to drink plenty water. The patient's BMI has been recorded in the chart. The patient has been provided educational materials regarding the benefits of attaining and maintaining a normal weight. We will continue to address and follow this issue during follow up visits. Patient agreed with assessment and plan. Patient verbalized understanding.

## 2020-03-20 ENCOUNTER — PATIENT MESSAGE (OUTPATIENT)
Dept: FAMILY MEDICINE | Facility: CLINIC | Age: 25
End: 2020-03-20

## 2020-03-20 RX ORDER — BUTALBITAL, ACETAMINOPHEN AND CAFFEINE 50; 325; 40 MG/1; MG/1; MG/1
1 TABLET ORAL EVERY 8 HOURS PRN
Qty: 30 TABLET | Refills: 0 | Status: ON HOLD | OUTPATIENT
Start: 2020-03-20 | End: 2021-03-11

## 2020-05-18 ENCOUNTER — TELEPHONE (OUTPATIENT)
Dept: FAMILY MEDICINE | Facility: CLINIC | Age: 25
End: 2020-05-18

## 2021-01-17 PROBLEM — O36.8190 DECREASED FETAL MOVEMENT: Status: ACTIVE | Noted: 2021-01-17

## 2021-03-04 PROBLEM — O26.899 ABDOMINAL PAIN AFFECTING PREGNANCY: Status: ACTIVE | Noted: 2021-03-04

## 2021-03-04 PROBLEM — R10.9 ABDOMINAL PAIN AFFECTING PREGNANCY: Status: ACTIVE | Noted: 2021-03-04

## 2021-03-11 PROBLEM — Z34.90 ENCOUNTER FOR ELECTIVE INDUCTION OF LABOR: Status: ACTIVE | Noted: 2021-03-11

## 2021-04-06 ENCOUNTER — PATIENT MESSAGE (OUTPATIENT)
Dept: ADMINISTRATIVE | Facility: HOSPITAL | Age: 26
End: 2021-04-06

## 2021-05-10 ENCOUNTER — PATIENT MESSAGE (OUTPATIENT)
Dept: RESEARCH | Facility: HOSPITAL | Age: 26
End: 2021-05-10

## 2021-07-07 ENCOUNTER — PATIENT MESSAGE (OUTPATIENT)
Dept: ADMINISTRATIVE | Facility: HOSPITAL | Age: 26
End: 2021-07-07

## 2021-07-22 ENCOUNTER — OFFICE VISIT (OUTPATIENT)
Dept: FAMILY MEDICINE | Facility: CLINIC | Age: 26
End: 2021-07-22
Payer: COMMERCIAL

## 2021-07-22 ENCOUNTER — LAB VISIT (OUTPATIENT)
Dept: LAB | Facility: HOSPITAL | Age: 26
End: 2021-07-22
Payer: COMMERCIAL

## 2021-07-22 VITALS
WEIGHT: 170.63 LBS | HEART RATE: 74 BPM | BODY MASS INDEX: 26.73 KG/M2 | OXYGEN SATURATION: 97 % | DIASTOLIC BLOOD PRESSURE: 78 MMHG | SYSTOLIC BLOOD PRESSURE: 116 MMHG

## 2021-07-22 DIAGNOSIS — Z00.00 WELLNESS EXAMINATION: Primary | ICD-10-CM

## 2021-07-22 DIAGNOSIS — Z13.6 ENCOUNTER FOR SCREENING FOR CARDIOVASCULAR DISORDERS: ICD-10-CM

## 2021-07-22 DIAGNOSIS — O99.280 HYPOTHYROIDISM DURING PREGNANCY, ANTEPARTUM: ICD-10-CM

## 2021-07-22 DIAGNOSIS — E03.9 HYPOTHYROIDISM DURING PREGNANCY, ANTEPARTUM: ICD-10-CM

## 2021-07-22 DIAGNOSIS — R11.0 NAUSEA: ICD-10-CM

## 2021-07-22 LAB
BASOPHILS # BLD AUTO: 0.03 K/UL (ref 0–0.2)
BASOPHILS NFR BLD: 0.5 % (ref 0–1.9)
DIFFERENTIAL METHOD: NORMAL
EOSINOPHIL # BLD AUTO: 0.1 K/UL (ref 0–0.5)
EOSINOPHIL NFR BLD: 1.9 % (ref 0–8)
ERYTHROCYTE [DISTWIDTH] IN BLOOD BY AUTOMATED COUNT: 12.9 % (ref 11.5–14.5)
HCT VFR BLD AUTO: 44 % (ref 37–48.5)
HGB BLD-MCNC: 14.8 G/DL (ref 12–16)
IMM GRANULOCYTES # BLD AUTO: 0.01 K/UL (ref 0–0.04)
IMM GRANULOCYTES NFR BLD AUTO: 0.2 % (ref 0–0.5)
LYMPHOCYTES # BLD AUTO: 1.4 K/UL (ref 1–4.8)
LYMPHOCYTES NFR BLD: 23.4 % (ref 18–48)
MCH RBC QN AUTO: 29 PG (ref 27–31)
MCHC RBC AUTO-ENTMCNC: 33.6 G/DL (ref 32–36)
MCV RBC AUTO: 86 FL (ref 82–98)
MONOCYTES # BLD AUTO: 0.5 K/UL (ref 0.3–1)
MONOCYTES NFR BLD: 7.8 % (ref 4–15)
NEUTROPHILS # BLD AUTO: 3.8 K/UL (ref 1.8–7.7)
NEUTROPHILS NFR BLD: 66.2 % (ref 38–73)
NRBC BLD-RTO: 0 /100 WBC
PLATELET # BLD AUTO: 252 K/UL (ref 150–450)
PMV BLD AUTO: 11.1 FL (ref 9.2–12.9)
RBC # BLD AUTO: 5.1 M/UL (ref 4–5.4)
WBC # BLD AUTO: 5.8 K/UL (ref 3.9–12.7)

## 2021-07-22 PROCEDURE — 80053 COMPREHEN METABOLIC PANEL: CPT | Performed by: PHYSICIAN ASSISTANT

## 2021-07-22 PROCEDURE — 84443 ASSAY THYROID STIM HORMONE: CPT | Performed by: PHYSICIAN ASSISTANT

## 2021-07-22 PROCEDURE — 99999 PR PBB SHADOW E&M-EST. PATIENT-LVL III: ICD-10-PCS | Mod: PBBFAC,,, | Performed by: PHYSICIAN ASSISTANT

## 2021-07-22 PROCEDURE — 85025 COMPLETE CBC W/AUTO DIFF WBC: CPT | Performed by: PHYSICIAN ASSISTANT

## 2021-07-22 PROCEDURE — 36415 COLL VENOUS BLD VENIPUNCTURE: CPT | Mod: PO | Performed by: PHYSICIAN ASSISTANT

## 2021-07-22 PROCEDURE — 99999 PR PBB SHADOW E&M-EST. PATIENT-LVL III: CPT | Mod: PBBFAC,,, | Performed by: PHYSICIAN ASSISTANT

## 2021-07-22 PROCEDURE — 83540 ASSAY OF IRON: CPT | Performed by: PHYSICIAN ASSISTANT

## 2021-07-22 PROCEDURE — 80061 LIPID PANEL: CPT | Performed by: PHYSICIAN ASSISTANT

## 2021-07-22 PROCEDURE — 99395 PR PREVENTIVE VISIT,EST,18-39: ICD-10-PCS | Mod: S$GLB,,, | Performed by: PHYSICIAN ASSISTANT

## 2021-07-22 PROCEDURE — 99395 PREV VISIT EST AGE 18-39: CPT | Mod: S$GLB,,, | Performed by: PHYSICIAN ASSISTANT

## 2021-07-22 RX ORDER — ONDANSETRON 8 MG/1
8 TABLET, ORALLY DISINTEGRATING ORAL EVERY 6 HOURS PRN
Qty: 30 TABLET | Refills: 1 | Status: SHIPPED | OUTPATIENT
Start: 2021-07-22 | End: 2023-05-15

## 2021-07-23 LAB
ALBUMIN SERPL BCP-MCNC: 4 G/DL (ref 3.5–5.2)
ALP SERPL-CCNC: 75 U/L (ref 55–135)
ALT SERPL W/O P-5'-P-CCNC: 15 U/L (ref 10–44)
ANION GAP SERPL CALC-SCNC: 11 MMOL/L (ref 8–16)
AST SERPL-CCNC: 18 U/L (ref 10–40)
BILIRUB SERPL-MCNC: 0.6 MG/DL (ref 0.1–1)
BUN SERPL-MCNC: 12 MG/DL (ref 6–20)
CALCIUM SERPL-MCNC: 9.2 MG/DL (ref 8.7–10.5)
CHLORIDE SERPL-SCNC: 107 MMOL/L (ref 95–110)
CHOLEST SERPL-MCNC: 140 MG/DL (ref 120–199)
CHOLEST/HDLC SERPL: 2.9 {RATIO} (ref 2–5)
CO2 SERPL-SCNC: 24 MMOL/L (ref 23–29)
CREAT SERPL-MCNC: 0.7 MG/DL (ref 0.5–1.4)
EST. GFR  (AFRICAN AMERICAN): >60 ML/MIN/1.73 M^2
EST. GFR  (NON AFRICAN AMERICAN): >60 ML/MIN/1.73 M^2
GLUCOSE SERPL-MCNC: 76 MG/DL (ref 70–110)
HDLC SERPL-MCNC: 48 MG/DL (ref 40–75)
HDLC SERPL: 34.3 % (ref 20–50)
IRON SERPL-MCNC: 94 UG/DL (ref 30–160)
LDLC SERPL CALC-MCNC: 80.8 MG/DL (ref 63–159)
NONHDLC SERPL-MCNC: 92 MG/DL
POTASSIUM SERPL-SCNC: 4 MMOL/L (ref 3.5–5.1)
PROT SERPL-MCNC: 7.1 G/DL (ref 6–8.4)
SATURATED IRON: 27 % (ref 20–50)
SODIUM SERPL-SCNC: 142 MMOL/L (ref 136–145)
TOTAL IRON BINDING CAPACITY: 346 UG/DL (ref 250–450)
TRANSFERRIN SERPL-MCNC: 234 MG/DL (ref 200–375)
TRIGL SERPL-MCNC: 56 MG/DL (ref 30–150)
TSH SERPL DL<=0.005 MIU/L-ACNC: 0.64 UIU/ML (ref 0.4–4)

## 2021-08-11 ENCOUNTER — HOSPITAL ENCOUNTER (OUTPATIENT)
Dept: RADIOLOGY | Facility: HOSPITAL | Age: 26
Discharge: HOME | End: 2021-08-11
Attending: FAMILY MEDICINE
Payer: COMMERCIAL

## 2021-08-11 ENCOUNTER — OFFICE VISIT (OUTPATIENT)
Dept: FAMILY MEDICINE | Facility: CLINIC | Age: 26
End: 2021-08-11
Payer: COMMERCIAL

## 2021-08-11 VITALS
SYSTOLIC BLOOD PRESSURE: 112 MMHG | HEART RATE: 86 BPM | DIASTOLIC BLOOD PRESSURE: 78 MMHG | WEIGHT: 166.88 LBS | BODY MASS INDEX: 26.14 KG/M2 | TEMPERATURE: 98 F | OXYGEN SATURATION: 96 %

## 2021-08-11 DIAGNOSIS — R10.33 PERIUMBILICAL ABDOMINAL PAIN: Primary | ICD-10-CM

## 2021-08-11 DIAGNOSIS — E61.1 IRON DEFICIENCY: ICD-10-CM

## 2021-08-11 DIAGNOSIS — E03.8 OTHER SPECIFIED HYPOTHYROIDISM: ICD-10-CM

## 2021-08-11 DIAGNOSIS — R10.33 PERIUMBILICAL ABDOMINAL PAIN: ICD-10-CM

## 2021-08-11 DIAGNOSIS — K62.5 BRIGHT RED BLOOD PER RECTUM: ICD-10-CM

## 2021-08-11 DIAGNOSIS — K59.09 OTHER CONSTIPATION: ICD-10-CM

## 2021-08-11 PROCEDURE — 99214 PR OFFICE/OUTPT VISIT, EST, LEVL IV, 30-39 MIN: ICD-10-PCS | Mod: S$GLB,,, | Performed by: FAMILY MEDICINE

## 2021-08-11 PROCEDURE — 99999 PR PBB SHADOW E&M-EST. PATIENT-LVL III: ICD-10-PCS | Mod: PBBFAC,,, | Performed by: FAMILY MEDICINE

## 2021-08-11 PROCEDURE — 99214 OFFICE O/P EST MOD 30 MIN: CPT | Mod: S$GLB,,, | Performed by: FAMILY MEDICINE

## 2021-08-11 PROCEDURE — 99999 PR PBB SHADOW E&M-EST. PATIENT-LVL III: CPT | Mod: PBBFAC,,, | Performed by: FAMILY MEDICINE

## 2021-08-11 PROCEDURE — 74018 RADEX ABDOMEN 1 VIEW: CPT | Mod: TC,FY,PO

## 2021-08-11 PROCEDURE — 74018 RADEX ABDOMEN 1 VIEW: CPT | Mod: 26,,, | Performed by: RADIOLOGY

## 2021-08-11 PROCEDURE — 74018 XR ABDOMEN AP 1 VIEW: ICD-10-PCS | Mod: 26,,, | Performed by: RADIOLOGY

## 2021-08-11 RX ORDER — NORELGESTROMIN AND ETHINYL ESTRADIOL 150; 35 UG/D; UG/D
PATCH TRANSDERMAL
COMMUNITY
Start: 2021-07-28 | End: 2022-07-06

## 2021-08-11 RX ORDER — PHENTERMINE HYDROCHLORIDE 37.5 MG/1
37.5 TABLET ORAL EVERY MORNING
COMMUNITY
Start: 2021-07-28 | End: 2022-07-06

## 2021-08-25 ENCOUNTER — IMMUNIZATION (OUTPATIENT)
Dept: FAMILY MEDICINE | Facility: CLINIC | Age: 26
End: 2021-08-25
Payer: COMMERCIAL

## 2021-08-25 DIAGNOSIS — Z23 NEED FOR VACCINATION: Primary | ICD-10-CM

## 2021-08-25 PROCEDURE — 0001A COVID-19, MRNA, LNP-S, PF, 30 MCG/0.3 ML DOSE VACCINE: CPT | Mod: CV19,,, | Performed by: INTERNAL MEDICINE

## 2021-08-25 PROCEDURE — 91300 COVID-19, MRNA, LNP-S, PF, 30 MCG/0.3 ML DOSE VACCINE: CPT | Mod: ,,, | Performed by: INTERNAL MEDICINE

## 2021-08-25 PROCEDURE — 0001A COVID-19, MRNA, LNP-S, PF, 30 MCG/0.3 ML DOSE VACCINE: ICD-10-PCS | Mod: CV19,,, | Performed by: INTERNAL MEDICINE

## 2021-08-25 PROCEDURE — 91300 COVID-19, MRNA, LNP-S, PF, 30 MCG/0.3 ML DOSE VACCINE: ICD-10-PCS | Mod: ,,, | Performed by: INTERNAL MEDICINE

## 2021-09-15 ENCOUNTER — IMMUNIZATION (OUTPATIENT)
Dept: FAMILY MEDICINE | Facility: CLINIC | Age: 26
End: 2021-09-15
Payer: COMMERCIAL

## 2021-09-15 DIAGNOSIS — Z23 NEED FOR VACCINATION: Primary | ICD-10-CM

## 2021-09-15 PROCEDURE — 0002A COVID-19, MRNA, LNP-S, PF, 30 MCG/0.3 ML DOSE VACCINE: CPT | Mod: CV19,,, | Performed by: FAMILY MEDICINE

## 2021-09-15 PROCEDURE — 91300 COVID-19, MRNA, LNP-S, PF, 30 MCG/0.3 ML DOSE VACCINE: ICD-10-PCS | Mod: ,,, | Performed by: FAMILY MEDICINE

## 2021-09-15 PROCEDURE — 0002A COVID-19, MRNA, LNP-S, PF, 30 MCG/0.3 ML DOSE VACCINE: ICD-10-PCS | Mod: CV19,,, | Performed by: FAMILY MEDICINE

## 2021-09-15 PROCEDURE — 91300 COVID-19, MRNA, LNP-S, PF, 30 MCG/0.3 ML DOSE VACCINE: CPT | Mod: ,,, | Performed by: FAMILY MEDICINE

## 2021-09-30 ENCOUNTER — LAB VISIT (OUTPATIENT)
Dept: PRIMARY CARE CLINIC | Facility: OTHER | Age: 26
End: 2021-09-30
Payer: COMMERCIAL

## 2021-09-30 DIAGNOSIS — J02.9 SORE THROAT: ICD-10-CM

## 2021-09-30 DIAGNOSIS — Z20.822 ENCOUNTER FOR LABORATORY TESTING FOR COVID-19 VIRUS: ICD-10-CM

## 2021-09-30 PROCEDURE — U0003 INFECTIOUS AGENT DETECTION BY NUCLEIC ACID (DNA OR RNA); SEVERE ACUTE RESPIRATORY SYNDROME CORONAVIRUS 2 (SARS-COV-2) (CORONAVIRUS DISEASE [COVID-19]), AMPLIFIED PROBE TECHNIQUE, MAKING USE OF HIGH THROUGHPUT TECHNOLOGIES AS DESCRIBED BY CMS-2020-01-R: HCPCS | Performed by: FAMILY MEDICINE

## 2021-10-01 LAB
SARS-COV-2 RNA RESP QL NAA+PROBE: NOT DETECTED
SARS-COV-2- CYCLE NUMBER: NORMAL

## 2021-10-03 ENCOUNTER — PATIENT MESSAGE (OUTPATIENT)
Dept: FAMILY MEDICINE | Facility: CLINIC | Age: 26
End: 2021-10-03

## 2021-10-10 ENCOUNTER — PATIENT MESSAGE (OUTPATIENT)
Dept: FAMILY MEDICINE | Facility: CLINIC | Age: 26
End: 2021-10-10

## 2021-10-11 RX ORDER — BUTALBITAL, ACETAMINOPHEN AND CAFFEINE 50; 325; 40 MG/1; MG/1; MG/1
1 TABLET ORAL EVERY 6 HOURS PRN
Qty: 28 TABLET | Refills: 0 | Status: SHIPPED | OUTPATIENT
Start: 2021-10-11 | End: 2021-10-11 | Stop reason: SDUPTHER

## 2021-11-22 ENCOUNTER — PATIENT MESSAGE (OUTPATIENT)
Dept: FAMILY MEDICINE | Facility: CLINIC | Age: 26
End: 2021-11-22
Payer: COMMERCIAL

## 2021-11-22 DIAGNOSIS — E16.2 HYPOGLYCEMIA: Primary | ICD-10-CM

## 2021-11-24 ENCOUNTER — LAB VISIT (OUTPATIENT)
Dept: LAB | Facility: HOSPITAL | Age: 26
End: 2021-11-24
Attending: FAMILY MEDICINE
Payer: COMMERCIAL

## 2021-11-24 DIAGNOSIS — E16.2 HYPOGLYCEMIA: ICD-10-CM

## 2021-11-24 LAB
ANION GAP SERPL CALC-SCNC: 8 MMOL/L (ref 8–16)
BUN SERPL-MCNC: 12 MG/DL (ref 6–20)
CALCIUM SERPL-MCNC: 9.3 MG/DL (ref 8.7–10.5)
CHLORIDE SERPL-SCNC: 105 MMOL/L (ref 95–110)
CO2 SERPL-SCNC: 24 MMOL/L (ref 23–29)
CREAT SERPL-MCNC: 0.7 MG/DL (ref 0.5–1.4)
EST. GFR  (AFRICAN AMERICAN): >60 ML/MIN/1.73 M^2
EST. GFR  (NON AFRICAN AMERICAN): >60 ML/MIN/1.73 M^2
ESTIMATED AVG GLUCOSE: 85 MG/DL (ref 68–131)
GLUCOSE SERPL-MCNC: 95 MG/DL (ref 70–110)
HBA1C MFR BLD: 4.6 % (ref 4–5.6)
INSULIN COLLECTION INTERVAL: NORMAL
INSULIN SERPL-ACNC: 10.8 UU/ML
POTASSIUM SERPL-SCNC: 3.8 MMOL/L (ref 3.5–5.1)
SODIUM SERPL-SCNC: 137 MMOL/L (ref 136–145)

## 2021-11-24 PROCEDURE — 83525 ASSAY OF INSULIN: CPT | Performed by: FAMILY MEDICINE

## 2021-11-24 PROCEDURE — 80048 BASIC METABOLIC PNL TOTAL CA: CPT | Performed by: FAMILY MEDICINE

## 2021-11-24 PROCEDURE — 83036 HEMOGLOBIN GLYCOSYLATED A1C: CPT | Performed by: FAMILY MEDICINE

## 2021-11-24 PROCEDURE — 36415 COLL VENOUS BLD VENIPUNCTURE: CPT | Mod: PO | Performed by: FAMILY MEDICINE

## 2021-11-28 ENCOUNTER — PATIENT MESSAGE (OUTPATIENT)
Dept: FAMILY MEDICINE | Facility: CLINIC | Age: 26
End: 2021-11-28
Payer: COMMERCIAL

## 2021-11-30 ENCOUNTER — PATIENT MESSAGE (OUTPATIENT)
Dept: FAMILY MEDICINE | Facility: CLINIC | Age: 26
End: 2021-11-30
Payer: COMMERCIAL

## 2022-07-06 ENCOUNTER — LAB VISIT (OUTPATIENT)
Dept: LAB | Facility: HOSPITAL | Age: 27
End: 2022-07-06
Attending: FAMILY MEDICINE
Payer: COMMERCIAL

## 2022-07-06 ENCOUNTER — OFFICE VISIT (OUTPATIENT)
Dept: FAMILY MEDICINE | Facility: CLINIC | Age: 27
End: 2022-07-06
Payer: COMMERCIAL

## 2022-07-06 VITALS
HEIGHT: 67 IN | DIASTOLIC BLOOD PRESSURE: 68 MMHG | RESPIRATION RATE: 18 BRPM | SYSTOLIC BLOOD PRESSURE: 98 MMHG | OXYGEN SATURATION: 99 % | HEART RATE: 73 BPM | WEIGHT: 136.44 LBS | BODY MASS INDEX: 21.42 KG/M2

## 2022-07-06 DIAGNOSIS — Z11.59 ENCOUNTER FOR HEPATITIS C SCREENING TEST FOR LOW RISK PATIENT: ICD-10-CM

## 2022-07-06 DIAGNOSIS — Z00.01 ANNUAL VISIT FOR GENERAL ADULT MEDICAL EXAMINATION WITH ABNORMAL FINDINGS: Primary | ICD-10-CM

## 2022-07-06 DIAGNOSIS — Z00.01 ANNUAL VISIT FOR GENERAL ADULT MEDICAL EXAMINATION WITH ABNORMAL FINDINGS: ICD-10-CM

## 2022-07-06 LAB
ALBUMIN SERPL BCP-MCNC: 4.2 G/DL (ref 3.5–5.2)
ALP SERPL-CCNC: 78 U/L (ref 55–135)
ALT SERPL W/O P-5'-P-CCNC: 13 U/L (ref 10–44)
ANION GAP SERPL CALC-SCNC: 11 MMOL/L (ref 8–16)
AST SERPL-CCNC: 16 U/L (ref 10–40)
BASOPHILS # BLD AUTO: 0.03 K/UL (ref 0–0.2)
BASOPHILS NFR BLD: 0.5 % (ref 0–1.9)
BILIRUB SERPL-MCNC: 0.5 MG/DL (ref 0.1–1)
BUN SERPL-MCNC: 11 MG/DL (ref 6–20)
CALCIUM SERPL-MCNC: 9.6 MG/DL (ref 8.7–10.5)
CHLORIDE SERPL-SCNC: 103 MMOL/L (ref 95–110)
CHOLEST SERPL-MCNC: 135 MG/DL (ref 120–199)
CHOLEST/HDLC SERPL: 2.8 {RATIO} (ref 2–5)
CO2 SERPL-SCNC: 26 MMOL/L (ref 23–29)
CREAT SERPL-MCNC: 0.8 MG/DL (ref 0.5–1.4)
DIFFERENTIAL METHOD: ABNORMAL
EOSINOPHIL # BLD AUTO: 0 K/UL (ref 0–0.5)
EOSINOPHIL NFR BLD: 0.5 % (ref 0–8)
ERYTHROCYTE [DISTWIDTH] IN BLOOD BY AUTOMATED COUNT: 12.6 % (ref 11.5–14.5)
EST. GFR  (AFRICAN AMERICAN): >60 ML/MIN/1.73 M^2
EST. GFR  (NON AFRICAN AMERICAN): >60 ML/MIN/1.73 M^2
GLUCOSE SERPL-MCNC: 74 MG/DL (ref 70–110)
HCT VFR BLD AUTO: 43.5 % (ref 37–48.5)
HDLC SERPL-MCNC: 49 MG/DL (ref 40–75)
HDLC SERPL: 36.3 % (ref 20–50)
HGB BLD-MCNC: 14.7 G/DL (ref 12–16)
IMM GRANULOCYTES # BLD AUTO: 0.01 K/UL (ref 0–0.04)
IMM GRANULOCYTES NFR BLD AUTO: 0.2 % (ref 0–0.5)
LDLC SERPL CALC-MCNC: 71.6 MG/DL (ref 63–159)
LYMPHOCYTES # BLD AUTO: 1.5 K/UL (ref 1–4.8)
LYMPHOCYTES NFR BLD: 22.4 % (ref 18–48)
MCH RBC QN AUTO: 31.2 PG (ref 27–31)
MCHC RBC AUTO-ENTMCNC: 33.8 G/DL (ref 32–36)
MCV RBC AUTO: 92 FL (ref 82–98)
MONOCYTES # BLD AUTO: 0.4 K/UL (ref 0.3–1)
MONOCYTES NFR BLD: 5.4 % (ref 4–15)
NEUTROPHILS # BLD AUTO: 4.6 K/UL (ref 1.8–7.7)
NEUTROPHILS NFR BLD: 71 % (ref 38–73)
NONHDLC SERPL-MCNC: 86 MG/DL
NRBC BLD-RTO: 0 /100 WBC
PLATELET # BLD AUTO: 244 K/UL (ref 150–450)
PMV BLD AUTO: 10.5 FL (ref 9.2–12.9)
POTASSIUM SERPL-SCNC: 3.9 MMOL/L (ref 3.5–5.1)
PROT SERPL-MCNC: 7.6 G/DL (ref 6–8.4)
RBC # BLD AUTO: 4.71 M/UL (ref 4–5.4)
SODIUM SERPL-SCNC: 140 MMOL/L (ref 136–145)
TRIGL SERPL-MCNC: 72 MG/DL (ref 30–150)
TSH SERPL DL<=0.005 MIU/L-ACNC: 0.91 UIU/ML (ref 0.4–4)
WBC # BLD AUTO: 6.52 K/UL (ref 3.9–12.7)

## 2022-07-06 PROCEDURE — 80053 COMPREHEN METABOLIC PANEL: CPT | Performed by: FAMILY MEDICINE

## 2022-07-06 PROCEDURE — 1159F PR MEDICATION LIST DOCUMENTED IN MEDICAL RECORD: ICD-10-PCS | Mod: CPTII,S$GLB,, | Performed by: FAMILY MEDICINE

## 2022-07-06 PROCEDURE — 3008F PR BODY MASS INDEX (BMI) DOCUMENTED: ICD-10-PCS | Mod: CPTII,S$GLB,, | Performed by: FAMILY MEDICINE

## 2022-07-06 PROCEDURE — 85025 COMPLETE CBC W/AUTO DIFF WBC: CPT | Performed by: FAMILY MEDICINE

## 2022-07-06 PROCEDURE — 3078F DIAST BP <80 MM HG: CPT | Mod: CPTII,S$GLB,, | Performed by: FAMILY MEDICINE

## 2022-07-06 PROCEDURE — 1159F MED LIST DOCD IN RCRD: CPT | Mod: CPTII,S$GLB,, | Performed by: FAMILY MEDICINE

## 2022-07-06 PROCEDURE — 3008F BODY MASS INDEX DOCD: CPT | Mod: CPTII,S$GLB,, | Performed by: FAMILY MEDICINE

## 2022-07-06 PROCEDURE — 3074F PR MOST RECENT SYSTOLIC BLOOD PRESSURE < 130 MM HG: ICD-10-PCS | Mod: CPTII,S$GLB,, | Performed by: FAMILY MEDICINE

## 2022-07-06 PROCEDURE — 3078F PR MOST RECENT DIASTOLIC BLOOD PRESSURE < 80 MM HG: ICD-10-PCS | Mod: CPTII,S$GLB,, | Performed by: FAMILY MEDICINE

## 2022-07-06 PROCEDURE — 99999 PR PBB SHADOW E&M-EST. PATIENT-LVL IV: CPT | Mod: PBBFAC,,, | Performed by: FAMILY MEDICINE

## 2022-07-06 PROCEDURE — 99999 PR PBB SHADOW E&M-EST. PATIENT-LVL IV: ICD-10-PCS | Mod: PBBFAC,,, | Performed by: FAMILY MEDICINE

## 2022-07-06 PROCEDURE — 84443 ASSAY THYROID STIM HORMONE: CPT | Performed by: FAMILY MEDICINE

## 2022-07-06 PROCEDURE — 80061 LIPID PANEL: CPT | Performed by: FAMILY MEDICINE

## 2022-07-06 PROCEDURE — 99395 PREV VISIT EST AGE 18-39: CPT | Mod: S$GLB,,, | Performed by: FAMILY MEDICINE

## 2022-07-06 PROCEDURE — 36415 COLL VENOUS BLD VENIPUNCTURE: CPT | Mod: PO | Performed by: FAMILY MEDICINE

## 2022-07-06 PROCEDURE — 86803 HEPATITIS C AB TEST: CPT | Performed by: FAMILY MEDICINE

## 2022-07-06 PROCEDURE — 99395 PR PREVENTIVE VISIT,EST,18-39: ICD-10-PCS | Mod: S$GLB,,, | Performed by: FAMILY MEDICINE

## 2022-07-06 PROCEDURE — 3074F SYST BP LT 130 MM HG: CPT | Mod: CPTII,S$GLB,, | Performed by: FAMILY MEDICINE

## 2022-07-06 RX ORDER — AMOXICILLIN AND CLAVULANATE POTASSIUM 875; 125 MG/1; MG/1
1 TABLET, FILM COATED ORAL 2 TIMES DAILY
COMMUNITY
Start: 2022-07-04 | End: 2022-09-19

## 2022-07-06 RX ORDER — SEMAGLUTIDE 1.34 MG/ML
INJECTION, SOLUTION SUBCUTANEOUS
COMMUNITY
Start: 2022-01-12 | End: 2022-09-19

## 2022-07-06 NOTE — PROGRESS NOTES
Assessment:       1. Annual visit for general adult medical examination with abnormal findings    2. Encounter for hepatitis C screening test for low risk patient        Plan:       Annual visit for general adult medical examination with abnormal findings  -     CBC Auto Differential; Future; Expected date: 07/06/2022  -     Comprehensive Metabolic Panel; Future; Expected date: 07/06/2022  -     Lipid Panel; Future; Expected date: 07/06/2022  -     TSH; Future; Expected date: 07/06/2022  -     Hepatitis C Antibody; Future; Expected date: 07/06/2022    Encounter for hepatitis C screening test for low risk patient  -     Hepatitis C Antibody; Future; Expected date: 07/06/2022    Continue healthy habits, avoid processed foods processed starches, will call the patient after we have the results of the test.   Patient agreed with assessment and plan. Patient verbalized understanding.     Subjective:       Patient ID: Claudia Sloan is a 27 y.o. female.    Chief Complaint: Annual Exam    HPI    The patient is coming here today for an annual examination, the patient stated that she is doing well, she lost more than 20 lb, the patient is currently taking semaglutide from another physician, she has been having good weight loss and feeling better, the patient stated that she is also eating good.  She has not developed any problems taking the medication.  The patient in the near future would like to get pregnant and her next office visit with the weight loss  she will discuss about other options instead of semaglutide as she does not want to gain  too much weight.    Past medical history, past social history was reviewed and discussed with the patient.    Review of Systems   Constitutional: Negative for activity change, appetite change and chills.   HENT: Negative for congestion and ear discharge.    Eyes: Negative for discharge and itching.   Respiratory: Negative for choking and chest tightness.     Cardiovascular: Negative for chest pain, palpitations and leg swelling.   Gastrointestinal: Negative for abdominal distention, abdominal pain and constipation.   Endocrine: Negative for cold intolerance and heat intolerance.   Genitourinary: Negative for dysuria and flank pain.   Musculoskeletal: Negative for arthralgias and back pain.   Skin: Negative for pallor and rash.   Allergic/Immunologic: Negative for environmental allergies and food allergies.   Neurological: Negative for dizziness, facial asymmetry and headaches.   Hematological: Negative for adenopathy. Does not bruise/bleed easily.   Psychiatric/Behavioral: Negative for agitation, confusion, decreased concentration and sleep disturbance.       Objective:      Physical Exam  Vitals and nursing note reviewed.   Constitutional:       General: She is not in acute distress.     Appearance: Normal appearance. She is well-developed and normal weight. She is not diaphoretic.   HENT:      Head: Normocephalic and atraumatic.      Right Ear: External ear normal.      Left Ear: External ear normal.      Nose: Nose normal.      Mouth/Throat:      Pharynx: No oropharyngeal exudate.   Eyes:      General: No scleral icterus.        Right eye: No discharge.         Left eye: No discharge.      Conjunctiva/sclera: Conjunctivae normal.      Pupils: Pupils are equal, round, and reactive to light.   Cardiovascular:      Rate and Rhythm: Normal rate and regular rhythm.      Heart sounds: Normal heart sounds.   Pulmonary:      Effort: Pulmonary effort is normal. No respiratory distress.      Breath sounds: Normal breath sounds. No wheezing.   Abdominal:      General: Abdomen is flat. Bowel sounds are normal. There is no distension.      Palpations: There is no mass.      Tenderness: There is no abdominal tenderness.   Musculoskeletal:         General: No tenderness or deformity.      Cervical back: Neck supple.   Skin:     Coloration: Skin is not pale.      Findings: No  erythema.   Neurological:      Mental Status: She is alert.      Cranial Nerves: No cranial nerve deficit.      Coordination: Coordination normal.   Psychiatric:         Behavior: Behavior normal.         Thought Content: Thought content normal.         Judgment: Judgment normal.

## 2022-07-07 LAB — HCV AB SERPL QL IA: NEGATIVE

## 2023-05-15 PROBLEM — Z34.90 ENCOUNTER FOR ELECTIVE INDUCTION OF LABOR: Status: RESOLVED | Noted: 2021-03-11 | Resolved: 2023-05-15

## 2023-05-15 PROBLEM — J35.3 HYPERPLASIA OF TONSILS AND ADENOIDS: Status: RESOLVED | Noted: 2018-01-24 | Resolved: 2023-05-15

## 2023-05-15 PROBLEM — K59.09 OTHER CONSTIPATION: Status: RESOLVED | Noted: 2021-08-11 | Resolved: 2023-05-15

## 2023-05-15 PROBLEM — R10.9 ABDOMINAL PAIN AFFECTING PREGNANCY: Status: RESOLVED | Noted: 2021-03-04 | Resolved: 2023-05-15

## 2023-05-15 PROBLEM — J35.03 TONSILLITIS AND ADENOIDITIS, CHRONIC: Status: RESOLVED | Noted: 2018-01-24 | Resolved: 2023-05-15

## 2023-05-15 PROBLEM — O00.80 OTHER ECTOPIC PREGNANCY WITHOUT INTRAUTERINE PREGNANCY: Status: RESOLVED | Noted: 2018-04-20 | Resolved: 2023-05-15

## 2023-05-15 PROBLEM — R10.33 PERIUMBILICAL ABDOMINAL PAIN: Status: RESOLVED | Noted: 2021-08-11 | Resolved: 2023-05-15

## 2023-05-15 PROBLEM — O36.8190 DECREASED FETAL MOVEMENT: Status: RESOLVED | Noted: 2021-01-17 | Resolved: 2023-05-15

## 2023-05-15 PROBLEM — O26.899 ABDOMINAL PAIN AFFECTING PREGNANCY: Status: RESOLVED | Noted: 2021-03-04 | Resolved: 2023-05-15

## 2023-05-16 PROBLEM — E03.9 ACQUIRED HYPOTHYROIDISM: Status: ACTIVE | Noted: 2021-08-11

## 2023-05-16 PROBLEM — K62.5 BRIGHT RED BLOOD PER RECTUM: Status: RESOLVED | Noted: 2021-08-11 | Resolved: 2023-05-16

## 2023-05-16 PROBLEM — G44.229 CHRONIC TENSION-TYPE HEADACHE, NOT INTRACTABLE: Status: ACTIVE | Noted: 2023-05-16

## 2023-07-28 ENCOUNTER — TELEPHONE (OUTPATIENT)
Dept: PHARMACY | Facility: CLINIC | Age: 28
End: 2023-07-28
Payer: COMMERCIAL

## 2023-07-28 NOTE — TELEPHONE ENCOUNTER
Patient called to request pricing and delivery for [facility], Rx sent over this morning. Patient needs medication by Monday. Advised patient that pricing may be more affordable at fertility pharmacy paying cash. [facility] and [facility] Saturday shipping. Patient aware, provided pharmacy phone number.

## 2023-08-29 PROBLEM — N92.6 IRREGULAR PERIODS: Status: ACTIVE | Noted: 2022-07-01

## 2023-08-29 PROBLEM — E28.2 POLYCYSTIC OVARY SYNDROME: Status: ACTIVE | Noted: 2022-12-13

## 2023-08-29 PROBLEM — E23.7: Status: ACTIVE | Noted: 2023-01-17

## 2023-08-29 PROBLEM — O00.90 ECTOPIC PREGNANCY: Status: ACTIVE | Noted: 2022-12-13

## 2023-10-30 PROBLEM — Z34.90 PREGNANCY: Status: ACTIVE | Noted: 2023-10-30

## 2023-10-30 PROBLEM — L03.031 CELLULITIS OF GREAT TOE OF RIGHT FOOT: Status: ACTIVE | Noted: 2023-10-30

## 2023-11-06 PROBLEM — Z34.91 FIRST TRIMESTER PREGNANCY: Status: ACTIVE | Noted: 2023-11-06

## 2023-11-06 PROBLEM — S91.101A OPEN WOUND OF RIGHT GREAT TOE: Status: ACTIVE | Noted: 2023-11-06

## 2023-11-06 PROBLEM — E87.6 HYPOKALEMIA: Status: ACTIVE | Noted: 2023-11-06

## 2023-11-06 PROBLEM — Z86.39 HISTORY OF HYPOTHYROIDISM: Status: ACTIVE | Noted: 2023-11-06

## 2023-11-06 PROBLEM — M86.071 ACUTE HEMATOGENOUS OSTEOMYELITIS OF RIGHT FOOT: Status: ACTIVE | Noted: 2023-11-06

## 2023-11-08 PROBLEM — M86.9 OSTEOMYELITIS: Status: ACTIVE | Noted: 2023-11-08

## 2023-11-09 RX ORDER — HEPARIN 100 UNIT/ML
500 SYRINGE INTRAVENOUS
OUTPATIENT
Start: 2023-11-10

## 2023-11-09 RX ORDER — SODIUM CHLORIDE 0.9 % (FLUSH) 0.9 %
10 SYRINGE (ML) INJECTION
Status: CANCELLED | OUTPATIENT
Start: 2023-11-10

## 2023-11-10 ENCOUNTER — TELEPHONE (OUTPATIENT)
Dept: INFECTIOUS DISEASES | Facility: CLINIC | Age: 28
End: 2023-11-10
Payer: COMMERCIAL

## 2023-11-13 ENCOUNTER — TELEPHONE (OUTPATIENT)
Dept: ADMINISTRATIVE | Facility: HOSPITAL | Age: 28
End: 2023-11-13
Payer: COMMERCIAL

## 2023-11-13 ENCOUNTER — OFFICE VISIT (OUTPATIENT)
Dept: PODIATRY | Facility: CLINIC | Age: 28
End: 2023-11-13
Payer: COMMERCIAL

## 2023-11-13 VITALS — HEIGHT: 67 IN | WEIGHT: 127 LBS | BODY MASS INDEX: 19.93 KG/M2

## 2023-11-13 DIAGNOSIS — S91.101D OPEN WOUND OF RIGHT GREAT TOE, SUBSEQUENT ENCOUNTER: Primary | ICD-10-CM

## 2023-11-13 PROCEDURE — 99213 OFFICE O/P EST LOW 20 MIN: CPT | Mod: S$GLB,,, | Performed by: STUDENT IN AN ORGANIZED HEALTH CARE EDUCATION/TRAINING PROGRAM

## 2023-11-13 PROCEDURE — 3008F BODY MASS INDEX DOCD: CPT | Mod: CPTII,S$GLB,, | Performed by: STUDENT IN AN ORGANIZED HEALTH CARE EDUCATION/TRAINING PROGRAM

## 2023-11-13 PROCEDURE — 1111F DSCHRG MED/CURRENT MED MERGE: CPT | Mod: CPTII,S$GLB,, | Performed by: STUDENT IN AN ORGANIZED HEALTH CARE EDUCATION/TRAINING PROGRAM

## 2023-11-13 PROCEDURE — 3008F PR BODY MASS INDEX (BMI) DOCUMENTED: ICD-10-PCS | Mod: CPTII,S$GLB,, | Performed by: STUDENT IN AN ORGANIZED HEALTH CARE EDUCATION/TRAINING PROGRAM

## 2023-11-13 PROCEDURE — 99999 PR PBB SHADOW E&M-EST. PATIENT-LVL III: CPT | Mod: PBBFAC,,, | Performed by: STUDENT IN AN ORGANIZED HEALTH CARE EDUCATION/TRAINING PROGRAM

## 2023-11-13 PROCEDURE — 1160F PR REVIEW ALL MEDS BY PRESCRIBER/CLIN PHARMACIST DOCUMENTED: ICD-10-PCS | Mod: CPTII,S$GLB,, | Performed by: STUDENT IN AN ORGANIZED HEALTH CARE EDUCATION/TRAINING PROGRAM

## 2023-11-13 PROCEDURE — 1159F PR MEDICATION LIST DOCUMENTED IN MEDICAL RECORD: ICD-10-PCS | Mod: CPTII,S$GLB,, | Performed by: STUDENT IN AN ORGANIZED HEALTH CARE EDUCATION/TRAINING PROGRAM

## 2023-11-13 PROCEDURE — 99213 PR OFFICE/OUTPT VISIT, EST, LEVL III, 20-29 MIN: ICD-10-PCS | Mod: S$GLB,,, | Performed by: STUDENT IN AN ORGANIZED HEALTH CARE EDUCATION/TRAINING PROGRAM

## 2023-11-13 PROCEDURE — 1111F PR DISCHARGE MEDS RECONCILED W/ CURRENT OUTPATIENT MED LIST: ICD-10-PCS | Mod: CPTII,S$GLB,, | Performed by: STUDENT IN AN ORGANIZED HEALTH CARE EDUCATION/TRAINING PROGRAM

## 2023-11-13 PROCEDURE — 1160F RVW MEDS BY RX/DR IN RCRD: CPT | Mod: CPTII,S$GLB,, | Performed by: STUDENT IN AN ORGANIZED HEALTH CARE EDUCATION/TRAINING PROGRAM

## 2023-11-13 PROCEDURE — 99999 PR PBB SHADOW E&M-EST. PATIENT-LVL III: ICD-10-PCS | Mod: PBBFAC,,, | Performed by: STUDENT IN AN ORGANIZED HEALTH CARE EDUCATION/TRAINING PROGRAM

## 2023-11-13 PROCEDURE — 1159F MED LIST DOCD IN RCRD: CPT | Mod: CPTII,S$GLB,, | Performed by: STUDENT IN AN ORGANIZED HEALTH CARE EDUCATION/TRAINING PROGRAM

## 2023-11-13 NOTE — PROGRESS NOTES
Subjective:      Patient ID: Claudia Sloan is a 28 y.o. female.    Chief Complaint: Post-op Evaluation    Ms. Sloan presents as a post op follow up from the hospital for a necrotic eschar formation of the right great toe following an ingrown toe nail procedure by another provider. She's had continued complications including two hospital stays. She currently has a PICC line and receiving rocephin. She has no pain and has been keeping the dressing dry. Surgical shoe has been hurting. She cannot fit the dressing into the boot. No pain when walking without an offloading shoe.     Review of Systems   Skin:         Wound R foot   All other systems reviewed and are negative.          Objective:      Physical Exam  Cardiovascular:      Pulses:           Dorsalis pedis pulses are 2+ on the right side.        Posterior tibial pulses are 2+ on the right side.   Feet:      Right foot:      Protective Sensation: 5 sites tested.  5 sites sensed.      Comments: R great toe:  Cellulitis and edema are fully resolved. There is still a wound on the medial great toe with an intact Integra graft with sutures intact.              Assessment:       Encounter Diagnosis   Name Primary?    Open wound of right great toe, subsequent encounter Yes         Plan:       Claudia was seen today for post-op evaluation.    Diagnoses and all orders for this visit:    Open wound of right great toe, subsequent encounter      I counseled the patient on her conditions, their implications and medical management.    Dressing was changed today.     Continue weekly dressing changes.    Pathology and cultures are negative to date. She's been doing fine on the IV antibiotics with PICC. I discussed case with Amisha Garcia (ID) and will likely treat for 2 weeks for SSTI with the PICC due to her history of success with IV antibiotics.     F/u 1 week.     Nicola Ochoa DPM

## 2023-11-13 NOTE — TELEPHONE ENCOUNTER
[facility]  [facility]   spoke with pt about getting line at [facility] and getting set up with IVABX.  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 HH set up with [facility].  We will deliver meds to house this evening and she will dose at home.  HH to add extensions and until they are able to add them on she can get someone to help her infuse.       Ana Ghotra R.N.   Clinical Service Liaison   [facility]   Cell: [phone]  Office: [phone]   Fax: [phone]

## 2023-11-14 ENCOUNTER — TELEPHONE (OUTPATIENT)
Dept: INFECTIOUS DISEASES | Facility: CLINIC | Age: 28
End: 2023-11-14

## 2023-11-14 ENCOUNTER — OFFICE VISIT (OUTPATIENT)
Dept: INFECTIOUS DISEASES | Facility: CLINIC | Age: 28
End: 2023-11-14
Payer: COMMERCIAL

## 2023-11-14 DIAGNOSIS — S91.101D OPEN WOUND OF RIGHT GREAT TOE, SUBSEQUENT ENCOUNTER: ICD-10-CM

## 2023-11-14 DIAGNOSIS — Z34.91 FIRST TRIMESTER PREGNANCY: ICD-10-CM

## 2023-11-14 DIAGNOSIS — L03.031 CELLULITIS OF GREAT TOE OF RIGHT FOOT: Primary | ICD-10-CM

## 2023-11-14 PROBLEM — M86.071 ACUTE HEMATOGENOUS OSTEOMYELITIS OF RIGHT FOOT: Status: RESOLVED | Noted: 2023-11-06 | Resolved: 2023-11-14

## 2023-11-14 PROCEDURE — 1111F DSCHRG MED/CURRENT MED MERGE: CPT | Mod: CPTII,95,, | Performed by: PHYSICIAN ASSISTANT

## 2023-11-14 PROCEDURE — 99214 OFFICE O/P EST MOD 30 MIN: CPT | Mod: 95,,, | Performed by: PHYSICIAN ASSISTANT

## 2023-11-14 PROCEDURE — 1160F PR REVIEW ALL MEDS BY PRESCRIBER/CLIN PHARMACIST DOCUMENTED: ICD-10-PCS | Mod: CPTII,95,, | Performed by: PHYSICIAN ASSISTANT

## 2023-11-14 PROCEDURE — 1160F RVW MEDS BY RX/DR IN RCRD: CPT | Mod: CPTII,95,, | Performed by: PHYSICIAN ASSISTANT

## 2023-11-14 PROCEDURE — 99214 PR OFFICE/OUTPT VISIT, EST, LEVL IV, 30-39 MIN: ICD-10-PCS | Mod: 95,,, | Performed by: PHYSICIAN ASSISTANT

## 2023-11-14 PROCEDURE — 1159F MED LIST DOCD IN RCRD: CPT | Mod: CPTII,95,, | Performed by: PHYSICIAN ASSISTANT

## 2023-11-14 PROCEDURE — 1159F PR MEDICATION LIST DOCUMENTED IN MEDICAL RECORD: ICD-10-PCS | Mod: CPTII,95,, | Performed by: PHYSICIAN ASSISTANT

## 2023-11-14 PROCEDURE — 1111F PR DISCHARGE MEDS RECONCILED W/ CURRENT OUTPATIENT MED LIST: ICD-10-PCS | Mod: CPTII,95,, | Performed by: PHYSICIAN ASSISTANT

## 2023-11-14 NOTE — TELEPHONE ENCOUNTER
Per SARAH Ann -   Contacted [facility] with [facility] with the follow ing orders via [place] and phone call:  EOC 11/18/23 per SARAH Ann - Please D/C IV abx, PICC and PICC care, labs after last dose on 11/18/23

## 2023-11-14 NOTE — PROGRESS NOTES
The patient location is: home  The chief complaint leading to consultation is: toe infection    Visit type: audiovisual    Face to Face time with patient: 10 mins  20 minutes of total time spent on the encounter, which includes face to face time and non-face to face time preparing to see the patient (eg, review of tests), Obtaining and/or reviewing separately obtained history, Documenting clinical information in the electronic or other health record, Independently interpreting results (not separately reported) and communicating results to the patient/family/caregiver, or Care coordination (not separately reported).         Each patient to whom he or she provides medical services by telemedicine is:  (1) informed of the relationship between the physician and patient and the respective role of any other health care provider with respect to management of the patient; and (2) notified that he or she may decline to receive medical services by telemedicine and may withdraw from such care at any time.    Notes:     [facility]  Infectious Disease        Patient ID: Claudia Sloan is a 28 y.o. female.    Chief Complaint: Wound Infection      Claudia was seen today for wound infection.    Diagnoses and all orders for this visit:    Cellulitis of great toe of right foot    Open wound of right great toe, subsequent encounter    First trimester pregnancy         Greater than 20 minutes was spent on this encounter, which included: review of recent encounters, review and interpretation of labs/images, obtaining pertinent history, performing a physical examination, counseling and educating the patient/family/caregiver, ordering medications/tests, documenting in the electronic health record, and coordinating care with necessary providers.    Interval HPI:   11/14 - Virtual ID f/u. Patient reports overall she is doing well. Only complaint is extreme fatigue which she is not sure if it is due to the IV abx  or the fact that she is 6 weeks pregnant. Compliant with home IV CTX infusions. Denies any pain in her toe.      Assessment and Plan:       # Right great toe infection  - 11/5 Blood cx: NGTD  - 11/6 Wound cx: NGTD  - 11/6 MRI: possible early osteomyelitis 1st digit proximal and distal phalanx with surrounding edema and cellulitis  - 11/8 s/p I&D and bone biopsy. Findings of necrotic eschar with underlying healthy skin and tissue. No obvious bone exposure. Bone biopsy obtained from distal phalanx and noted to be soft. Graft applied  - OR Cx: NGTD, no organisms on gram stain  - Pathology: Fibrosis with bony remodeling, no acute osteomyelitis, sparse chronic inflammation     # Gestation, first trimester  - limits antibiotic options     Recommendations:  - Labs remain WNL. Pathology negative for acute osteomyelitis. Cultures have remained negative  - Discussed with Dr. Ochoa and her wound is healing well with the graft  - since there is no evidence of osteomyelitis, will only treat for SSTI. She is tolerating CTX without issues  - cont Ceftriaxone 2g IV q24h  - length of therapy: 10 days from surgery (EOC 11/18/23)  - following last dose, Home Health can pull PICC line  - she should continue to f/u with Podiatry as instructed  - if wound flares after treatment, she should be re-cultured prior to starting any additional antibiotics in order to have culture information  - she may f/u with ID as needed     Discussed with ID Staff, Dr. Armstrong  Discussed with Podiatry, Dr. Don Garcia PAValdemarC  Infectious Diseases  [facility]         HPI:      27yo female with a PMH of anxiety, depression currently 6 weeks pregnant admitted to [facility] for right great toe infection.\     - early October 2023 she saw Podiatry Dr. Cheek for an ingrown toenail which was removed  - that evening, had significant pain. She called the next morning and was prescribed doxycycline with some improvement in swelling, but  did have decreased sensation. Also had purulent/serosanguinous drainage  - shortly after being prescribed doxy, she found out she was pregnant so she did not complete the full course  - saw PCP who gave keflex instead without improvement  - 10/30/23 went to ED and was given IV CTX and Clinda. XRY without definite evidence of osteo and labs WNL. She was seen by Podiatry Dr. Ochoa who recommended continued wound care and close outpatient f/u. She was discharged home on Augmentin  - 11/5/23 she returned to [facility] ED for worsening foot pain, swelling, erythema, and drainage. She was given IV Clinda and CTX  - MRI was obtained which was suspicious for osteomyelitis  - Abx were held and she underwent I&D with bone biopsy on 11/8     Infectious Diseases has been consulted for evaluation and management.      Past Medical History:   Diagnosis Date    Abnormal Pap smear of cervix     LEEP    Anemia     Anxiety and depression     Cellulitis of great toe of right foot     Chronic constipation     Chronic tension type headache     Complication of anesthesia     Former smoker     Hypothyroidism     associated with preganancy, resolved    Ingrown toenail     Other ectopic pregnancy without intrauterine pregnancy 04/20/2018    Seasonal allergies        Past Surgical History:   Procedure Laterality Date    APPLICATION, GRAFT Right 11/8/2023    Procedure: APPLICATION OF MESHED BILAYER WOUIND MATRIX;  Surgeon: Nicola Ochoa DPM;  Location: [facility] OR;  Service: Podiatry;  Laterality: Right;    BONE BIOPSY Right 11/8/2023    Procedure: Biopsy-Bone;  Surgeon: Nicola Ochoa DPM;  Location: [facility] OR;  Service: Podiatry;  Laterality: Right;    breast reduction Bilateral     BREAST SURGERY Bilateral 2014    breast reduction    DEBRIDEMENT, PHALANX, FOOT Right 11/8/2023    Procedure: DEBRIDEMENT, RIGHT FIRST TOE;  Surgeon: Nicola Ochoa DPM;  Location: [facility] OR;  Service: Podiatry;  Laterality: Right;    ESOPHAGOGASTRODUODENOSCOPY       FOOT SURGERY      phenol matrixectomy    LYMPH NODE BIOPSY Left 2016    benign    TONSILLECTOMY  2018       Family History   Problem Relation Age of Onset    ADD / ADHD Mother     Chiari malformation Mother     ADD / ADHD Brother     Stroke Maternal Grandmother     Diabetes Maternal Grandfather     Colon polyps Maternal Grandfather         in his 50's    Esophageal cancer Maternal Grandfather     Prostate cancer Maternal Grandfather     Diabetes Paternal Grandmother     Glaucoma Neg Hx        Social History     Socioeconomic History    Marital status:     Number of children: 0   Occupational History    Occupation:    Tobacco Use    Smoking status: Former     Current packs/day: 0.00     Types: Cigarettes     Quit date: 2015     Years since quittin.8    Smokeless tobacco: Never    Tobacco comments:     Socially smoked in high school   Substance and Sexual Activity    Alcohol use: No     Alcohol/week: 0.0 standard drinks of alcohol    Drug use: No    Sexual activity: Yes     Partners: Male     Social Determinants of Health     Financial Resource Strain: Low Risk  (2023)    Overall Financial Resource Strain (CARDIA)     Difficulty of Paying Living Expenses: Not hard at all   Food Insecurity: No Food Insecurity (2023)    Hunger Vital Sign     Worried About Running Out of Food in the Last Year: Never true     Ran Out of Food in the Last Year: Never true   Transportation Needs: No Transportation Needs (2023)    PRAPARE - Transportation     Lack of Transportation (Medical): No     Lack of Transportation (Non-Medical): No   Physical Activity: Sufficiently Active (2023)    Exercise Vital Sign     Days of Exercise per Week: 7 days     Minutes of Exercise per Session: 60 min   Stress: Stress Concern Present (2023)    [employer] [place] [employer] - Occupational Stress Questionnaire     Feeling of Stress : To some extent   Social Connections:  "Unknown (8/1/2023)    Social Connection and Isolation Panel [NHANES]     Frequency of Communication with Friends and Family: More than three times a week     Frequency of Social Gatherings with Friends and Family: Three times a week     Active Member of Clubs or Organizations: No     Attends Club or Organization Meetings: Patient refused     Marital Status:    Housing Stability: Low Risk  (11/6/2023)    Housing Stability Vital Sign     Unable to Pay for Housing in the Last Year: No     Number of Places Lived in the Last Year: 1     Unstable Housing in the Last Year: No       Review of patient's allergies indicates:   Allergen Reactions    Rocuronium     Succinylcholine Other (See Comments)       Current Outpatient Medications   Medication Instructions    acetaminophen (TYLENOL) 650 mg, Oral, Every 6 hours PRN    BD LUER-MAGALYS SYRINGE 3 mL 22 x 1 1/2" Syrg No dose, route, or frequency recorded.    BD SPECIALTY USE NEEDLES 30 gauge x 1/2" Ndle No dose, route, or frequency recorded.    buPROPion (WELLBUTRIN XL) 150 mg, Oral, Every morning    collagenase (SANTYL) ointment Topical (Top), Daily    dextrose 5 % in water (D5W) PgBk 100 mL with cefTRIAXone 2 gram SolR 2 g 2 g, Intravenous, Daily    ferrous sulfate (FEOSOL) 325 mg, Oral, Daily    HYDROcodone-acetaminophen (NORCO) 5-325 mg per tablet 1 tablet, Oral, Every 6 hours PRN    ondansetron (ZOFRAN-ODT) 8 mg, Oral, Every 12 hours PRN    PNV,calcium 72/iron/folic acid (PRENATAL VITAMIN) Tab 1 tablet, Oral, Daily    promethazine (PHENERGAN) 12.5 mg, Oral, Every 8 hours PRN    VITAMIN B-6 25 mg, Oral, Every 6 hours PRN         Review of Systems   Constitutional:  Positive for fatigue. Negative for chills, diaphoresis and fever.   HENT:  Negative for congestion and sore throat.    Eyes:  Negative for photophobia and visual disturbance.   Respiratory:  Negative for cough and shortness of breath.    Cardiovascular:  Negative for chest pain, palpitations and leg " swelling.   Gastrointestinal:  Negative for abdominal pain, diarrhea, nausea and vomiting.   Genitourinary:  Negative for dysuria, flank pain, hematuria and urgency.   Musculoskeletal:  Negative for joint swelling, myalgias, neck pain and neck stiffness.   Skin:  Positive for wound. Negative for color change and rash.   Allergic/Immunologic: Negative for immunocompromised state.   Neurological:  Negative for dizziness, weakness, numbness and headaches.   Psychiatric/Behavioral:  Negative for confusion.            Objective:     There were no vitals filed for this visit.           Physical Exam  Constitutional:       General: She is not in acute distress.     Appearance: She is well-developed. She is not diaphoretic.   HENT:      Right Ear: External ear normal.      Left Ear: External ear normal.      Nose: Nose normal.   Eyes:      General: No scleral icterus.     Extraocular Movements: Extraocular movements intact.      Conjunctiva/sclera: Conjunctivae normal.   Pulmonary:      Effort: Pulmonary effort is normal. No respiratory distress.   Musculoskeletal:      Cervical back: Normal range of motion.   Neurological:      Mental Status: She is alert and oriented to person, place, and time.   Psychiatric:         Behavior: Behavior normal.           CrCl cannot be calculated (Unknown ideal weight.).      Microbiology Results (last 7 days)       ** No results found for the last 168 hours. **              Significant Labs: All pertinent labs within the past 24 hours have been reviewed.     Significant Imaging: I have reviewed all relevant and available imaging results/findings within the past 24 hours.      Plan -- see top of note

## 2023-11-15 ENCOUNTER — PATIENT MESSAGE (OUTPATIENT)
Dept: PODIATRY | Facility: CLINIC | Age: 28
End: 2023-11-15

## 2023-11-20 ENCOUNTER — OFFICE VISIT (OUTPATIENT)
Dept: PODIATRY | Facility: CLINIC | Age: 28
End: 2023-11-20
Payer: COMMERCIAL

## 2023-11-20 VITALS — BODY MASS INDEX: 19.78 KG/M2 | WEIGHT: 126 LBS | HEIGHT: 67 IN

## 2023-11-20 DIAGNOSIS — S91.101D OPEN WOUND OF RIGHT GREAT TOE, SUBSEQUENT ENCOUNTER: Primary | ICD-10-CM

## 2023-11-20 PROCEDURE — 99999 PR PBB SHADOW E&M-EST. PATIENT-LVL III: ICD-10-PCS | Mod: PBBFAC,,, | Performed by: STUDENT IN AN ORGANIZED HEALTH CARE EDUCATION/TRAINING PROGRAM

## 2023-11-20 PROCEDURE — 99024 POSTOP FOLLOW-UP VISIT: CPT | Mod: S$GLB,,, | Performed by: STUDENT IN AN ORGANIZED HEALTH CARE EDUCATION/TRAINING PROGRAM

## 2023-11-20 PROCEDURE — 99024 PR POST-OP FOLLOW-UP VISIT: ICD-10-PCS | Mod: S$GLB,,, | Performed by: STUDENT IN AN ORGANIZED HEALTH CARE EDUCATION/TRAINING PROGRAM

## 2023-11-20 PROCEDURE — 1160F PR REVIEW ALL MEDS BY PRESCRIBER/CLIN PHARMACIST DOCUMENTED: ICD-10-PCS | Mod: CPTII,S$GLB,, | Performed by: STUDENT IN AN ORGANIZED HEALTH CARE EDUCATION/TRAINING PROGRAM

## 2023-11-20 PROCEDURE — 99999 PR PBB SHADOW E&M-EST. PATIENT-LVL III: CPT | Mod: PBBFAC,,, | Performed by: STUDENT IN AN ORGANIZED HEALTH CARE EDUCATION/TRAINING PROGRAM

## 2023-11-20 PROCEDURE — 1159F PR MEDICATION LIST DOCUMENTED IN MEDICAL RECORD: ICD-10-PCS | Mod: CPTII,S$GLB,, | Performed by: STUDENT IN AN ORGANIZED HEALTH CARE EDUCATION/TRAINING PROGRAM

## 2023-11-20 PROCEDURE — 1160F RVW MEDS BY RX/DR IN RCRD: CPT | Mod: CPTII,S$GLB,, | Performed by: STUDENT IN AN ORGANIZED HEALTH CARE EDUCATION/TRAINING PROGRAM

## 2023-11-20 PROCEDURE — 1159F MED LIST DOCD IN RCRD: CPT | Mod: CPTII,S$GLB,, | Performed by: STUDENT IN AN ORGANIZED HEALTH CARE EDUCATION/TRAINING PROGRAM

## 2023-11-20 NOTE — PROGRESS NOTES
Subjective:      Patient ID: Claudia Sloan is a 28 y.o. female.    Chief Complaint: Follow-up and Wound Check    Ms. Sloan presents as a post op follow up from the hospital for a necrotic eschar formation of the right great toe following an ingrown toe nail procedure by another provider. She's had continued complications including two hospital stays. She currently has a PICC line and receiving rocephin. She has no pain and has been keeping the dressing dry. Surgical shoe has been hurting. She cannot fit the dressing into the boot. No pain when walking without an offloading shoe.     11/20/23: f/u wound R foot. Doing well overall. Some discomfort with walking, similar to a tens unit to the toe.     Review of Systems   Skin:         Wound R foot   All other systems reviewed and are negative.          Objective:      Physical Exam  Cardiovascular:      Pulses:           Dorsalis pedis pulses are 2+ on the right side.        Posterior tibial pulses are 2+ on the right side.   Feet:      Right foot:      Protective Sensation: 5 sites tested.  5 sites sensed.      Comments: R great toe:  There is still a wound on the medial great toe with an intact Integra graft with sutures intact.  No edema, or erythema appreciated.               Assessment:       Encounter Diagnosis   Name Primary?    Open wound of right great toe, subsequent encounter Yes           Plan:       Claudia was seen today for follow-up and wound check.    Diagnoses and all orders for this visit:    Open wound of right great toe, subsequent encounter        I counseled the patient on her conditions, their implications and medical management.    Dressing was changed today.     Continue weekly dressing changes.    She's been off antibiotics and PICC line removed. Toe continues to do well.     F/u 1 week.     Nicola Ochoa DPM

## 2023-11-24 ENCOUNTER — TELEPHONE (OUTPATIENT)
Dept: PODIATRY | Facility: CLINIC | Age: 28
End: 2023-11-24
Payer: COMMERCIAL

## 2023-11-24 NOTE — TELEPHONE ENCOUNTER
----- Message from Martinez Bertrand sent at 11/24/2023  7:55 AM CST -----  Type:  Same Day Appointment Request    Caller is requesting a same day appointment.  Caller declined first available appointment listed below.      Name of Caller:  Patient  When is the first available appointment?  11/30/23  Symptoms: Post Op complications  Best Call Back Number:   [phone]  Additional Information:

## 2023-11-27 ENCOUNTER — TELEPHONE (OUTPATIENT)
Dept: PODIATRY | Facility: CLINIC | Age: 28
End: 2023-11-27

## 2023-11-27 ENCOUNTER — OFFICE VISIT (OUTPATIENT)
Dept: PODIATRY | Facility: CLINIC | Age: 28
End: 2023-11-27
Payer: COMMERCIAL

## 2023-11-27 ENCOUNTER — PATIENT MESSAGE (OUTPATIENT)
Dept: PODIATRY | Facility: CLINIC | Age: 28
End: 2023-11-27

## 2023-11-27 VITALS — HEIGHT: 67 IN | BODY MASS INDEX: 19.78 KG/M2 | WEIGHT: 126 LBS

## 2023-11-27 DIAGNOSIS — S91.101D OPEN WOUND OF RIGHT GREAT TOE, SUBSEQUENT ENCOUNTER: Primary | ICD-10-CM

## 2023-11-27 PROCEDURE — 99999 PR PBB SHADOW E&M-EST. PATIENT-LVL III: CPT | Mod: PBBFAC,,, | Performed by: STUDENT IN AN ORGANIZED HEALTH CARE EDUCATION/TRAINING PROGRAM

## 2023-11-27 PROCEDURE — 99212 PR OFFICE/OUTPT VISIT, EST, LEVL II, 10-19 MIN: ICD-10-PCS | Mod: S$GLB,,, | Performed by: STUDENT IN AN ORGANIZED HEALTH CARE EDUCATION/TRAINING PROGRAM

## 2023-11-27 PROCEDURE — 1160F RVW MEDS BY RX/DR IN RCRD: CPT | Mod: CPTII,S$GLB,, | Performed by: STUDENT IN AN ORGANIZED HEALTH CARE EDUCATION/TRAINING PROGRAM

## 2023-11-27 PROCEDURE — 1159F PR MEDICATION LIST DOCUMENTED IN MEDICAL RECORD: ICD-10-PCS | Mod: CPTII,S$GLB,, | Performed by: STUDENT IN AN ORGANIZED HEALTH CARE EDUCATION/TRAINING PROGRAM

## 2023-11-27 PROCEDURE — 3008F BODY MASS INDEX DOCD: CPT | Mod: CPTII,S$GLB,, | Performed by: STUDENT IN AN ORGANIZED HEALTH CARE EDUCATION/TRAINING PROGRAM

## 2023-11-27 PROCEDURE — 99999 PR PBB SHADOW E&M-EST. PATIENT-LVL III: ICD-10-PCS | Mod: PBBFAC,,, | Performed by: STUDENT IN AN ORGANIZED HEALTH CARE EDUCATION/TRAINING PROGRAM

## 2023-11-27 PROCEDURE — 1160F PR REVIEW ALL MEDS BY PRESCRIBER/CLIN PHARMACIST DOCUMENTED: ICD-10-PCS | Mod: CPTII,S$GLB,, | Performed by: STUDENT IN AN ORGANIZED HEALTH CARE EDUCATION/TRAINING PROGRAM

## 2023-11-27 PROCEDURE — 1111F PR DISCHARGE MEDS RECONCILED W/ CURRENT OUTPATIENT MED LIST: ICD-10-PCS | Mod: CPTII,S$GLB,, | Performed by: STUDENT IN AN ORGANIZED HEALTH CARE EDUCATION/TRAINING PROGRAM

## 2023-11-27 PROCEDURE — 1111F DSCHRG MED/CURRENT MED MERGE: CPT | Mod: CPTII,S$GLB,, | Performed by: STUDENT IN AN ORGANIZED HEALTH CARE EDUCATION/TRAINING PROGRAM

## 2023-11-27 PROCEDURE — 3008F PR BODY MASS INDEX (BMI) DOCUMENTED: ICD-10-PCS | Mod: CPTII,S$GLB,, | Performed by: STUDENT IN AN ORGANIZED HEALTH CARE EDUCATION/TRAINING PROGRAM

## 2023-11-27 PROCEDURE — 99212 OFFICE O/P EST SF 10 MIN: CPT | Mod: S$GLB,,, | Performed by: STUDENT IN AN ORGANIZED HEALTH CARE EDUCATION/TRAINING PROGRAM

## 2023-11-27 PROCEDURE — 1159F MED LIST DOCD IN RCRD: CPT | Mod: CPTII,S$GLB,, | Performed by: STUDENT IN AN ORGANIZED HEALTH CARE EDUCATION/TRAINING PROGRAM

## 2023-11-27 NOTE — TELEPHONE ENCOUNTER
----- Message from Lakeshia Miller sent at 11/27/2023  1:02 PM CST -----  Contact: pt  Type:  Needs Medical Advice    Who Called: pt    Would the patient rather a call back or a response via [facility]? Call back    Best Call Back Number: [phone]    Additional Information: Needs a medical release for return to work. She sent it via portal. Please fill out as soon as poss. And call her when ready.      Please call to advise  Thanks

## 2023-11-27 NOTE — TELEPHONE ENCOUNTER
Sent pt message through [facility] letting them know we received the paperwork and was given to Dr. Ochoa to fill out.

## 2023-11-27 NOTE — PROGRESS NOTES
Subjective:      Patient ID: Claudia Sloan is a 28 y.o. female.    Chief Complaint: Wound Check    Ms. Sloan presents as a post op follow up from the hospital for a necrotic eschar formation of the right great toe following an ingrown toe nail procedure by another provider. She's had continued complications including two hospital stays. She currently has a PICC line and receiving rocephin. She has no pain and has been keeping the dressing dry. Surgical shoe has been hurting. She cannot fit the dressing into the boot. No pain when walking without an offloading shoe.     11/20/23: f/u wound R foot. Doing well overall. Some discomfort with walking, similar to a tens unit to the toe.     11/27/23: f/u R foot wound. Started having some pain a few days ago and it's improved some.     Review of Systems   Skin:         Wound R foot   All other systems reviewed and are negative.          Objective:      Physical Exam  Cardiovascular:      Pulses:           Dorsalis pedis pulses are 2+ on the right side.        Posterior tibial pulses are 2+ on the right side.   Feet:      Right foot:      Protective Sensation: 5 sites tested.  5 sites sensed.      Comments: R great toe:  Medial great toe wound. Integra graft is peeling from the edges.               Assessment:       Encounter Diagnosis   Name Primary?    Open wound of right great toe, subsequent encounter Yes             Plan:       Claudia was seen today for wound check.    Diagnoses and all orders for this visit:    Open wound of right great toe, subsequent encounter          I counseled the patient on her conditions, their implications and medical management.    Dressing was changed today.     Continue weekly dressing changes.    Peeled off the silicone layer of jacque. Applied saline moistened jacque and a DSD.    F/u 1 week.     Nicola Ochoa DPM

## 2023-11-30 ENCOUNTER — TELEPHONE (OUTPATIENT)
Dept: PODIATRY | Facility: CLINIC | Age: 28
End: 2023-11-30
Payer: COMMERCIAL

## 2023-11-30 NOTE — TELEPHONE ENCOUNTER
Spoke with  he stated that he filled out paper work and was faxed over this morning . I called and spoke with pt and told her doc said it was faxed over this am . Told pt to give us a call back if any questions or concerns

## 2023-12-04 ENCOUNTER — TELEPHONE (OUTPATIENT)
Dept: PODIATRY | Facility: CLINIC | Age: 28
End: 2023-12-04
Payer: COMMERCIAL

## 2023-12-04 ENCOUNTER — OFFICE VISIT (OUTPATIENT)
Dept: PODIATRY | Facility: CLINIC | Age: 28
End: 2023-12-04
Payer: COMMERCIAL

## 2023-12-04 VITALS — WEIGHT: 126 LBS | BODY MASS INDEX: 19.78 KG/M2 | HEIGHT: 67 IN

## 2023-12-04 DIAGNOSIS — S91.101D OPEN WOUND OF RIGHT GREAT TOE, SUBSEQUENT ENCOUNTER: Primary | ICD-10-CM

## 2023-12-04 PROCEDURE — 1111F DSCHRG MED/CURRENT MED MERGE: CPT | Mod: CPTII,S$GLB,, | Performed by: STUDENT IN AN ORGANIZED HEALTH CARE EDUCATION/TRAINING PROGRAM

## 2023-12-04 PROCEDURE — 3008F BODY MASS INDEX DOCD: CPT | Mod: CPTII,S$GLB,, | Performed by: STUDENT IN AN ORGANIZED HEALTH CARE EDUCATION/TRAINING PROGRAM

## 2023-12-04 PROCEDURE — 1159F PR MEDICATION LIST DOCUMENTED IN MEDICAL RECORD: ICD-10-PCS | Mod: CPTII,S$GLB,, | Performed by: STUDENT IN AN ORGANIZED HEALTH CARE EDUCATION/TRAINING PROGRAM

## 2023-12-04 PROCEDURE — 1111F PR DISCHARGE MEDS RECONCILED W/ CURRENT OUTPATIENT MED LIST: ICD-10-PCS | Mod: CPTII,S$GLB,, | Performed by: STUDENT IN AN ORGANIZED HEALTH CARE EDUCATION/TRAINING PROGRAM

## 2023-12-04 PROCEDURE — 3008F PR BODY MASS INDEX (BMI) DOCUMENTED: ICD-10-PCS | Mod: CPTII,S$GLB,, | Performed by: STUDENT IN AN ORGANIZED HEALTH CARE EDUCATION/TRAINING PROGRAM

## 2023-12-04 PROCEDURE — 99999 PR PBB SHADOW E&M-EST. PATIENT-LVL III: ICD-10-PCS | Mod: PBBFAC,,, | Performed by: STUDENT IN AN ORGANIZED HEALTH CARE EDUCATION/TRAINING PROGRAM

## 2023-12-04 PROCEDURE — 99999 PR PBB SHADOW E&M-EST. PATIENT-LVL III: CPT | Mod: PBBFAC,,, | Performed by: STUDENT IN AN ORGANIZED HEALTH CARE EDUCATION/TRAINING PROGRAM

## 2023-12-04 PROCEDURE — 99213 PR OFFICE/OUTPT VISIT, EST, LEVL III, 20-29 MIN: ICD-10-PCS | Mod: S$GLB,,, | Performed by: STUDENT IN AN ORGANIZED HEALTH CARE EDUCATION/TRAINING PROGRAM

## 2023-12-04 PROCEDURE — 99213 OFFICE O/P EST LOW 20 MIN: CPT | Mod: S$GLB,,, | Performed by: STUDENT IN AN ORGANIZED HEALTH CARE EDUCATION/TRAINING PROGRAM

## 2023-12-04 PROCEDURE — 1160F PR REVIEW ALL MEDS BY PRESCRIBER/CLIN PHARMACIST DOCUMENTED: ICD-10-PCS | Mod: CPTII,S$GLB,, | Performed by: STUDENT IN AN ORGANIZED HEALTH CARE EDUCATION/TRAINING PROGRAM

## 2023-12-04 PROCEDURE — 1160F RVW MEDS BY RX/DR IN RCRD: CPT | Mod: CPTII,S$GLB,, | Performed by: STUDENT IN AN ORGANIZED HEALTH CARE EDUCATION/TRAINING PROGRAM

## 2023-12-04 PROCEDURE — 1159F MED LIST DOCD IN RCRD: CPT | Mod: CPTII,S$GLB,, | Performed by: STUDENT IN AN ORGANIZED HEALTH CARE EDUCATION/TRAINING PROGRAM

## 2023-12-06 NOTE — PROGRESS NOTES
Subjective:      Patient ID: Claudia Sloan is a 28 y.o. female.    Chief Complaint: Wound Check    Ms. Sloan presents as a post op follow up from the hospital for a necrotic eschar formation of the right great toe following an ingrown toe nail procedure by another provider. She's had continued complications including two hospital stays. She currently has a PICC line and receiving rocephin. She has no pain and has been keeping the dressing dry. Surgical shoe has been hurting. She cannot fit the dressing into the boot. No pain when walking without an offloading shoe.     11/20/23: f/u wound R foot. Doing well overall. Some discomfort with walking, similar to a tens unit to the toe.     11/27/23: f/u R foot wound. Started having some pain a few days ago and it's improved some.     12/5/23: f/u R foot wound.    Review of Systems   Skin:         Wound R foot   All other systems reviewed and are negative.          Objective:      Physical Exam  Cardiovascular:      Pulses:           Dorsalis pedis pulses are 2+ on the right side.        Posterior tibial pulses are 2+ on the right side.   Feet:      Right foot:      Protective Sensation: 5 sites tested.  5 sites sensed.      Comments: R great toe:  Medial great toe wound. Hypergranulation at the skin margins.               Assessment:       Encounter Diagnosis   Name Primary?    Open wound of right great toe, subsequent encounter Yes               Plan:       Claudia was seen today for wound check.    Diagnoses and all orders for this visit:    Open wound of right great toe, subsequent encounter      I counseled the patient on her conditions, their implications and medical management.    Dressing was changed today.     Continue weekly dressing changes.    Again applied saline moistened jacque and a DSD.    F/u 1 week.     Nicola Ochoa DPM

## 2023-12-08 ENCOUNTER — OFFICE VISIT (OUTPATIENT)
Dept: CARDIOLOGY | Facility: CLINIC | Age: 28
End: 2023-12-08
Payer: COMMERCIAL

## 2023-12-08 ENCOUNTER — LAB VISIT (OUTPATIENT)
Dept: LAB | Facility: HOSPITAL | Age: 28
End: 2023-12-08
Attending: INTERNAL MEDICINE
Payer: COMMERCIAL

## 2023-12-08 VITALS
WEIGHT: 162.69 LBS | HEIGHT: 67 IN | HEART RATE: 75 BPM | DIASTOLIC BLOOD PRESSURE: 62 MMHG | SYSTOLIC BLOOD PRESSURE: 105 MMHG | BODY MASS INDEX: 25.53 KG/M2

## 2023-12-08 DIAGNOSIS — R06.02 SOB (SHORTNESS OF BREATH): Primary | ICD-10-CM

## 2023-12-08 DIAGNOSIS — R60.9 FLUID RETENTION: ICD-10-CM

## 2023-12-08 DIAGNOSIS — R06.02 SOB (SHORTNESS OF BREATH): ICD-10-CM

## 2023-12-08 DIAGNOSIS — Z3A.10 10 WEEKS GESTATION OF PREGNANCY: Chronic | ICD-10-CM

## 2023-12-08 DIAGNOSIS — E03.9 ACQUIRED HYPOTHYROIDISM: ICD-10-CM

## 2023-12-08 DIAGNOSIS — E03.9 ACQUIRED HYPOTHYROIDISM: Chronic | ICD-10-CM

## 2023-12-08 DIAGNOSIS — R01.1 SYSTOLIC MURMUR: ICD-10-CM

## 2023-12-08 LAB
BNP SERPL-MCNC: 26 PG/ML (ref 0–99)
T3 SERPL-MCNC: 88 NG/DL (ref 60–180)
T4 SERPL-MCNC: 8.2 UG/DL (ref 4.5–11.5)
TSH SERPL DL<=0.005 MIU/L-ACNC: 0.85 UIU/ML (ref 0.4–4)

## 2023-12-08 PROCEDURE — 93010 EKG 12-LEAD: ICD-10-PCS | Mod: S$GLB,,, | Performed by: INTERNAL MEDICINE

## 2023-12-08 PROCEDURE — 3078F PR MOST RECENT DIASTOLIC BLOOD PRESSURE < 80 MM HG: ICD-10-PCS | Mod: CPTII,S$GLB,, | Performed by: INTERNAL MEDICINE

## 2023-12-08 PROCEDURE — 1111F DSCHRG MED/CURRENT MED MERGE: CPT | Mod: CPTII,S$GLB,, | Performed by: INTERNAL MEDICINE

## 2023-12-08 PROCEDURE — 84443 ASSAY THYROID STIM HORMONE: CPT | Performed by: INTERNAL MEDICINE

## 2023-12-08 PROCEDURE — 1159F PR MEDICATION LIST DOCUMENTED IN MEDICAL RECORD: ICD-10-PCS | Mod: CPTII,S$GLB,, | Performed by: INTERNAL MEDICINE

## 2023-12-08 PROCEDURE — 3008F PR BODY MASS INDEX (BMI) DOCUMENTED: ICD-10-PCS | Mod: CPTII,S$GLB,, | Performed by: INTERNAL MEDICINE

## 2023-12-08 PROCEDURE — 83880 ASSAY OF NATRIURETIC PEPTIDE: CPT | Performed by: INTERNAL MEDICINE

## 2023-12-08 PROCEDURE — 3074F PR MOST RECENT SYSTOLIC BLOOD PRESSURE < 130 MM HG: ICD-10-PCS | Mod: CPTII,S$GLB,, | Performed by: INTERNAL MEDICINE

## 2023-12-08 PROCEDURE — 84436 ASSAY OF TOTAL THYROXINE: CPT | Performed by: INTERNAL MEDICINE

## 2023-12-08 PROCEDURE — 3078F DIAST BP <80 MM HG: CPT | Mod: CPTII,S$GLB,, | Performed by: INTERNAL MEDICINE

## 2023-12-08 PROCEDURE — 84480 ASSAY TRIIODOTHYRONINE (T3): CPT | Performed by: INTERNAL MEDICINE

## 2023-12-08 PROCEDURE — 36415 COLL VENOUS BLD VENIPUNCTURE: CPT | Mod: PO | Performed by: INTERNAL MEDICINE

## 2023-12-08 PROCEDURE — 1159F MED LIST DOCD IN RCRD: CPT | Mod: CPTII,S$GLB,, | Performed by: INTERNAL MEDICINE

## 2023-12-08 PROCEDURE — 1111F PR DISCHARGE MEDS RECONCILED W/ CURRENT OUTPATIENT MED LIST: ICD-10-PCS | Mod: CPTII,S$GLB,, | Performed by: INTERNAL MEDICINE

## 2023-12-08 PROCEDURE — 99999 PR PBB SHADOW E&M-EST. PATIENT-LVL III: CPT | Mod: PBBFAC,,, | Performed by: INTERNAL MEDICINE

## 2023-12-08 PROCEDURE — 99204 OFFICE O/P NEW MOD 45 MIN: CPT | Mod: 25,S$GLB,, | Performed by: INTERNAL MEDICINE

## 2023-12-08 PROCEDURE — 3008F BODY MASS INDEX DOCD: CPT | Mod: CPTII,S$GLB,, | Performed by: INTERNAL MEDICINE

## 2023-12-08 PROCEDURE — 3074F SYST BP LT 130 MM HG: CPT | Mod: CPTII,S$GLB,, | Performed by: INTERNAL MEDICINE

## 2023-12-08 PROCEDURE — 99999 PR PBB SHADOW E&M-EST. PATIENT-LVL III: ICD-10-PCS | Mod: PBBFAC,,, | Performed by: INTERNAL MEDICINE

## 2023-12-08 PROCEDURE — 93005 ELECTROCARDIOGRAM TRACING: CPT | Mod: PO

## 2023-12-08 PROCEDURE — 99204 PR OFFICE/OUTPT VISIT, NEW, LEVL IV, 45-59 MIN: ICD-10-PCS | Mod: 25,S$GLB,, | Performed by: INTERNAL MEDICINE

## 2023-12-08 PROCEDURE — 93010 ELECTROCARDIOGRAM REPORT: CPT | Mod: S$GLB,,, | Performed by: INTERNAL MEDICINE

## 2023-12-08 NOTE — PROGRESS NOTES
Subjective:    Patient ID:  Claudia Sloan is a 28 y.o. female who presents for Shortness of Breath        Shortness of Breath  Associated symptoms include leg swelling. Pertinent negatives include no abdominal pain, chest pain, fever, hemoptysis, orthopnea, PND, rash or syncope.       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, HAD RECENT  SURGERY GFRAFT R FOOT, AFTER INGROWN TOENAIL, REMOVAL, INFECTION, HAD VENOUS RLE NO DVT, SEE ROS  Past Medical History:   Diagnosis Date    Abnormal Pap smear of cervix     LEEP    Anemia     Anxiety and depression     Cellulitis of great toe of right foot     Chronic constipation     Chronic tension type headache     Complication of anesthesia     Former smoker     Hypothyroidism     associated with preganancy, resolved    Ingrown toenail     Other ectopic pregnancy without intrauterine pregnancy 04/20/2018    Seasonal allergies      Past Surgical History:   Procedure Laterality Date    APPLICATION, GRAFT Right 11/8/2023    Procedure: APPLICATION OF MESHED BILAYER WOUIND MATRIX;  Surgeon: Nicola Ochoa DPM;  Location: [facility] OR;  Service: Podiatry;  Laterality: Right;    BONE BIOPSY Right 11/8/2023    Procedure: Biopsy-Bone;  Surgeon: Nicola Ochoa DPM;  Location: [facility] OR;  Service: Podiatry;  Laterality: Right;    breast reduction Bilateral     BREAST SURGERY Bilateral 2014    breast reduction    DEBRIDEMENT, PHALANX, FOOT Right 11/8/2023    Procedure: DEBRIDEMENT, RIGHT FIRST TOE;  Surgeon: Nicola Ochoa DPM;  Location: [facility] OR;  Service: Podiatry;  Laterality: Right;    ESOPHAGOGASTRODUODENOSCOPY      FOOT SURGERY      phenol matrixectomy    LYMPH NODE BIOPSY Left 05/01/2016    benign    TONSILLECTOMY  01/24/2018     Family History   Problem Relation Age of Onset    ADD / ADHD Mother     Chiari malformation Mother     ADD /  ADHD Brother     Stroke Maternal Grandmother     Diabetes Maternal Grandfather     Colon polyps Maternal Grandfather         in his 50's    Esophageal cancer Maternal Grandfather     Prostate cancer Maternal Grandfather     Diabetes Paternal Grandmother     Glaucoma Neg Hx      Social History     Socioeconomic History    Marital status:     Number of children: 0   Occupational History    Occupation:    Tobacco Use    Smoking status: Former     Current packs/day: 0.00     Types: Cigarettes     Quit date: 2015     Years since quittin.9    Smokeless tobacco: Never    Tobacco comments:     Socially smoked in high school   Substance and Sexual Activity    Alcohol use: No     Alcohol/week: 0.0 standard drinks of alcohol    Drug use: No    Sexual activity: Yes     Partners: Male     Social Determinants of Health     Financial Resource Strain: Low Risk  (2023)    Overall Financial Resource Strain (CARDIA)     Difficulty of Paying Living Expenses: Not hard at all   Food Insecurity: No Food Insecurity (2023)    Hunger Vital Sign     Worried About Running Out of Food in the Last Year: Never true     Ran Out of Food in the Last Year: Never true   Transportation Needs: No Transportation Needs (2023)    PRAPARE - Transportation     Lack of Transportation (Medical): No     Lack of Transportation (Non-Medical): No   Physical Activity: Sufficiently Active (2023)    Exercise Vital Sign     Days of Exercise per Week: 3 days     Minutes of Exercise per Session: 60 min   Stress: No Stress Concern Present (2023)    [employer] - Occupational Stress Questionnaire     Feeling of Stress : Only a little   Social Connections: Unknown (2023)    Social Connection and Isolation Panel [NHANES]     Frequency of Communication with Friends and Family: More than three times a week     Frequency of Social Gatherings with Friends and Family: Three times  a week     Active Member of Clubs or Organizations: Yes     Attends Club or Organization Meetings: 1 to 4 times per year     Marital Status:    Housing Stability: Low Risk  (12/7/2023)    Housing Stability Vital Sign     Unable to Pay for Housing in the Last Year: No     Number of Places Lived in the Last Year: 1     Unstable Housing in the Last Year: No       Review of patient's allergies indicates:   Allergen Reactions    Rocuronium     Succinylcholine Other (See Comments)       Current Outpatient Medications:     acetaminophen (TYLENOL) 325 MG tablet, Take 2 tablets (650 mg total) by mouth every 6 (six) hours as needed for Pain., Disp: , Rfl: 0    ferrous sulfate (FEOSOL) 325 mg (65 mg iron) Tab tablet, Take 325 mg by mouth once daily., Disp: , Rfl:     ondansetron (ZOFRAN-ODT) 4 MG TbDL, Take 8 mg by mouth every 12 (twelve) hours as needed (nausea/vomiting)., Disp: , Rfl:     PNV,calcium 72/iron/folic acid (PRENATAL VITAMIN) Tab, Take 1 tablet by mouth once daily., Disp: , Rfl:     promethazine (PHENERGAN) 12.5 MG Tab, Take 12.5 mg by mouth every 8 (eight) hours as needed for Nausea/Vomiting., Disp: , Rfl:     VITAMIN B-6 25 MG tablet, Take 25 mg by mouth every 6 (six) hours as needed (nausea)., Disp: , Rfl:     buPROPion (WELLBUTRIN XL) 150 MG TB24 tablet, Take 150 mg by mouth every morning., Disp: , Rfl:     collagenase (SANTYL) ointment, Apply topically once daily. (Patient not taking: Reported on 11/17/2023), Disp: 30 g, Rfl: 0    Review of Systems   Constitutional: Positive for malaise/fatigue and weight gain. Negative for chills, decreased appetite, diaphoresis, fever and night sweats.   HENT:  Negative for congestion and stridor.    Eyes:  Negative for blurred vision and vision loss in right eye.   Cardiovascular:  Positive for dyspnea on exertion and leg swelling. Negative for chest pain, cyanosis, near-syncope, orthopnea, palpitations, paroxysmal nocturnal dyspnea and syncope.   Respiratory:   "Positive for shortness of breath. Negative for cough and hemoptysis.    Endocrine: Negative for polyphagia and polyuria.   Hematologic/Lymphatic: Negative for adenopathy. Does not bruise/bleed easily.   Skin:  Positive for poor wound healing (AFTERINGROWN TOE NAIL). Negative for color change and rash.   Musculoskeletal:  Negative for back pain and falls.   Gastrointestinal:  Positive for nausea. Negative for abdominal pain and dysphagia.   Genitourinary:  Negative for dysuria and flank pain.   Neurological:  Negative for brief paralysis, dizziness and focal weakness.   Psychiatric/Behavioral:  Negative for altered mental status and depression.    Allergic/Immunologic: Negative for hives and persistent infections.        Objective:      Vitals:    12/08/23 1200   BP: 105/62   Pulse: 75   Weight: 73.8 kg (162 lb 11.2 oz)   Height: 5' 7" (1.702 m)   PainSc: 0-No pain     Body mass index is 25.48 kg/m².    Physical Exam  Constitutional:       Appearance: Normal appearance. She is well-developed and well-groomed.   HENT:      Head: Normocephalic and atraumatic.   Eyes:      General: No scleral icterus.     Extraocular Movements: Extraocular movements intact.      Conjunctiva/sclera: Conjunctivae normal.   Cardiovascular:      Rate and Rhythm: Normal rate and regular rhythm. No extrasystoles are present.     Pulses:           Carotid pulses are 2+ on the right side and 2+ on the left side.       Radial pulses are 2+ on the right side and 2+ on the left side.      Heart sounds: Murmur heard.      Systolic murmur is present with a grade of 1/6 at the upper right sternal border.      No friction rub. No gallop.   Pulmonary:      Effort: Pulmonary effort is normal. No respiratory distress.      Breath sounds: Normal breath sounds and air entry. No rales.   Abdominal:      Palpations: Abdomen is soft.      Tenderness: There is no abdominal tenderness.      Comments: PREGNANT   Musculoskeletal:      Right lower leg: No edema. "      Left lower leg: No edema.   Feet:      Comments: R FOOT BOOT  Skin:     Capillary Refill: Capillary refill takes less than 2 seconds.   Neurological:      General: No focal deficit present.      Mental Status: She is alert and oriented to person, place, and time.      Cranial Nerves: No cranial nerve deficit.   Psychiatric:         Mood and Affect: Mood normal.         Speech: Speech normal.         Behavior: Behavior normal.                 ..    Chemistry        Component Value Date/Time     12/05/2023 0737    K 3.5 12/05/2023 0737     12/05/2023 0737    CO2 23 12/05/2023 0737    BUN 18 12/05/2023 0737    CREATININE 0.46 (L) 12/05/2023 0737    GLU 75 12/05/2023 0737        Component Value Date/Time    CALCIUM 8.7 12/05/2023 0737    ALKPHOS 49 12/05/2023 0737    AST 27 12/05/2023 0737    AST 21 02/19/2016 0715    ALT 20 12/05/2023 0737    BILITOT 0.3 12/05/2023 0737    ESTGFRAFRICA >60.0 07/06/2022 0823    EGFRNONAA >60.0 07/06/2022 0823            ..  Lab Results   Component Value Date    CHOL 135 07/06/2022    CHOL 140 07/22/2021    CHOL 150 01/09/2020     Lab Results   Component Value Date    HDL 49 07/06/2022    HDL 48 07/22/2021    HDL 65 01/09/2020     Lab Results   Component Value Date    LDLCALC 71.6 07/06/2022    LDLCALC 80.8 07/22/2021    LDLCALC 65.4 01/09/2020     Lab Results   Component Value Date    TRIG 72 07/06/2022    TRIG 56 07/22/2021    TRIG 98 01/09/2020     Lab Results   Component Value Date    CHOLHDL 36.3 07/06/2022    CHOLHDL 34.3 07/22/2021    CHOLHDL 43.3 01/09/2020     ..  Lab Results   Component Value Date    WBC 7.44 12/05/2023    HGB 12.3 12/05/2023    HCT 36.9 (L) 12/05/2023    MCV 90 12/05/2023     12/05/2023       Test(s) Reviewed  I have reviewed the following in detail:  [] Stress test   [] Angiography   [x] Echocardiogram   [x] Labs   [x] Other:       Assessment:         ICD-10-CM ICD-9-CM   1. SOB (shortness of breath)  R06.02 786.05   2. Fluid retention   R60.9 276.69   3. Acquired hypothyroidism  E03.9 244.9   4. 10 weeks gestation of pregnancy  Z3A.10 V22.2   5. Systolic murmur  R01.1 785.2     Problem List Items Addressed This Visit          Pulmonary    SOB (shortness of breath) - Primary    Relevant Orders    EKG 12-lead (Completed)    Echo    BNP (Completed)       Cardiac/Vascular    Systolic murmur       Endocrine    Acquired hypothyroidism    Relevant Orders    TSH (Completed)    T3 (Completed)    T4 (Completed)       Obstetric    Pregnancy       Other    Fluid retention    Relevant Orders    Echo    BNP (Completed)    TSH (Completed)    T3 (Completed)    T4 (Completed)        Plan:     EKG WNL, ,  WILL CHECK LABS, BNP, TFT;S, ECHO, NO ANGINA NO OVERT HEART FAILURE NO CLINICAL ARRHYTHMIA EXERCISE AS MUCH AS POSSIBLE, RTC  AFTER TESTS      SOB (shortness of breath)  -     EKG 12-lead  -     Echo  -     BNP; Future; Expected date: 12/08/2023    Fluid retention  -     Echo  -     BNP; Future; Expected date: 12/08/2023  -     TSH; Future; Expected date: 12/08/2023  -     T3; Future; Expected date: 12/08/2023  -     T4; Future; Expected date: 12/08/2023    Acquired hypothyroidism  -     TSH; Future; Expected date: 12/08/2023  -     T3; Future; Expected date: 12/08/2023  -     T4; Future; Expected date: 12/08/2023    10 weeks gestation of pregnancy    Systolic murmur  Comments:  ECHO    RTC Low level/low impact aerobic exercise 5x's/wk. Heart healthy diet and risk factor modification.    See labs and med orders.    Aerobic exercise 5x's/wk. Heart healthy diet and risk factor modification.    See labs and med orders.

## 2023-12-11 ENCOUNTER — OFFICE VISIT (OUTPATIENT)
Dept: PODIATRY | Facility: CLINIC | Age: 28
End: 2023-12-11
Payer: COMMERCIAL

## 2023-12-11 VITALS — HEIGHT: 67 IN | BODY MASS INDEX: 25.43 KG/M2 | WEIGHT: 162 LBS

## 2023-12-11 DIAGNOSIS — S91.101D OPEN WOUND OF RIGHT GREAT TOE, SUBSEQUENT ENCOUNTER: Primary | ICD-10-CM

## 2023-12-11 PROCEDURE — 1159F PR MEDICATION LIST DOCUMENTED IN MEDICAL RECORD: ICD-10-PCS | Mod: CPTII,S$GLB,, | Performed by: STUDENT IN AN ORGANIZED HEALTH CARE EDUCATION/TRAINING PROGRAM

## 2023-12-11 PROCEDURE — 99999 PR PBB SHADOW E&M-EST. PATIENT-LVL III: CPT | Mod: PBBFAC,,, | Performed by: STUDENT IN AN ORGANIZED HEALTH CARE EDUCATION/TRAINING PROGRAM

## 2023-12-11 PROCEDURE — 1160F PR REVIEW ALL MEDS BY PRESCRIBER/CLIN PHARMACIST DOCUMENTED: ICD-10-PCS | Mod: CPTII,S$GLB,, | Performed by: STUDENT IN AN ORGANIZED HEALTH CARE EDUCATION/TRAINING PROGRAM

## 2023-12-11 PROCEDURE — 3008F BODY MASS INDEX DOCD: CPT | Mod: CPTII,S$GLB,, | Performed by: STUDENT IN AN ORGANIZED HEALTH CARE EDUCATION/TRAINING PROGRAM

## 2023-12-11 PROCEDURE — 3008F PR BODY MASS INDEX (BMI) DOCUMENTED: ICD-10-PCS | Mod: CPTII,S$GLB,, | Performed by: STUDENT IN AN ORGANIZED HEALTH CARE EDUCATION/TRAINING PROGRAM

## 2023-12-11 PROCEDURE — 99999 PR PBB SHADOW E&M-EST. PATIENT-LVL III: ICD-10-PCS | Mod: PBBFAC,,, | Performed by: STUDENT IN AN ORGANIZED HEALTH CARE EDUCATION/TRAINING PROGRAM

## 2023-12-11 PROCEDURE — 1160F RVW MEDS BY RX/DR IN RCRD: CPT | Mod: CPTII,S$GLB,, | Performed by: STUDENT IN AN ORGANIZED HEALTH CARE EDUCATION/TRAINING PROGRAM

## 2023-12-11 PROCEDURE — 99213 OFFICE O/P EST LOW 20 MIN: CPT | Mod: S$GLB,,, | Performed by: STUDENT IN AN ORGANIZED HEALTH CARE EDUCATION/TRAINING PROGRAM

## 2023-12-11 PROCEDURE — 1159F MED LIST DOCD IN RCRD: CPT | Mod: CPTII,S$GLB,, | Performed by: STUDENT IN AN ORGANIZED HEALTH CARE EDUCATION/TRAINING PROGRAM

## 2023-12-11 PROCEDURE — 99213 PR OFFICE/OUTPT VISIT, EST, LEVL III, 20-29 MIN: ICD-10-PCS | Mod: S$GLB,,, | Performed by: STUDENT IN AN ORGANIZED HEALTH CARE EDUCATION/TRAINING PROGRAM

## 2023-12-13 NOTE — PROGRESS NOTES
Subjective:      Patient ID: Claudia Sloan is a 28 y.o. female.    Chief Complaint: Wound Check    Ms. Sloan presents as a post op follow up from the hospital for a necrotic eschar formation of the right great toe following an ingrown toe nail procedure by another provider. She's had continued complications including two hospital stays. She currently has a PICC line and receiving rocephin. She has no pain and has been keeping the dressing dry. Surgical shoe has been hurting. She cannot fit the dressing into the boot. No pain when walking without an offloading shoe.     11/20/23: f/u wound R foot. Doing well overall. Some discomfort with walking, similar to a tens unit to the toe.     11/27/23: f/u R foot wound. Started having some pain a few days ago and it's improved some.     12/5/23: f/u R foot wound.    12/12/23: f/u R foot wound. Doing well overall.     Review of Systems   Skin:         Wound R foot   All other systems reviewed and are negative.          Objective:      Physical Exam  Cardiovascular:      Pulses:           Dorsalis pedis pulses are 2+ on the right side.        Posterior tibial pulses are 2+ on the right side.   Feet:      Right foot:      Protective Sensation: 5 sites tested.  5 sites sensed.      Comments: R great toe:  Medial great toe wound. Wound bed is leveled. Epithelialization beginning.               Assessment:       Encounter Diagnosis   Name Primary?    Open wound of right great toe, subsequent encounter Yes                 Plan:       Claudia was seen today for wound check.    Diagnoses and all orders for this visit:    Open wound of right great toe, subsequent encounter        I counseled the patient on her conditions, their implications and medical management.    Dressing changed today. We will stop jacque and do more regular dressing changes with medihoney.    F/u in 1-2 weeks.    Nicola Ochoa DPM

## 2023-12-14 ENCOUNTER — CLINICAL SUPPORT (OUTPATIENT)
Dept: CARDIOLOGY | Facility: HOSPITAL | Age: 28
End: 2023-12-14
Attending: INTERNAL MEDICINE
Payer: COMMERCIAL

## 2023-12-14 VITALS — WEIGHT: 162 LBS | HEIGHT: 67 IN | BODY MASS INDEX: 25.43 KG/M2

## 2023-12-14 PROCEDURE — 93306 TTE W/DOPPLER COMPLETE: CPT | Mod: 26,,, | Performed by: INTERNAL MEDICINE

## 2023-12-14 PROCEDURE — 93306 ECHO (CUPID ONLY): ICD-10-PCS | Mod: 26,,, | Performed by: INTERNAL MEDICINE

## 2023-12-14 PROCEDURE — 93306 TTE W/DOPPLER COMPLETE: CPT | Mod: PO

## 2023-12-15 LAB
ASCENDING AORTA: 2.5 CM
AV INDEX (PROSTH): 0.66
AV MEAN GRADIENT: 6 MMHG
AV PEAK GRADIENT: 10 MMHG
AV VALVE AREA BY VELOCITY RATIO: 2.19 CM²
AV VALVE AREA: 2.13 CM²
AV VELOCITY RATIO: 0.68
BSA FOR ECHO PROCEDURE: 1.86 M2
CV ECHO LV RWT: 0.32 CM
DOP CALC AO PEAK VEL: 1.58 M/S
DOP CALC AO VTI: 36.3 CM
DOP CALC LVOT AREA: 3.2 CM2
DOP CALC LVOT DIAMETER: 2.03 CM
DOP CALC LVOT PEAK VEL: 1.07 M/S
DOP CALC LVOT STROKE VOLUME: 77.31 CM3
DOP CALCLVOT PEAK VEL VTI: 23.9 CM
E WAVE DECELERATION TIME: 196.97 MSEC
E/A RATIO: 1.81
E/E' RATIO: 7.24 M/S
ECHO LV POSTERIOR WALL: 0.77 CM (ref 0.6–1.1)
EJECTION FRACTION: 70 %
FRACTIONAL SHORTENING: 43 % (ref 28–44)
INTERVENTRICULAR SEPTUM: 0.6 CM (ref 0.6–1.1)
IVRT: 81.83 MSEC
LEFT ATRIUM SIZE: 3.29 CM
LEFT ATRIUM VOLUME INDEX MOD: 32.1 ML/M2
LEFT ATRIUM VOLUME MOD: 59.4 CM3
LEFT INTERNAL DIMENSION IN SYSTOLE: 2.8 CM (ref 2.1–4)
LEFT VENTRICLE DIASTOLIC VOLUME INDEX: 60.3 ML/M2
LEFT VENTRICLE DIASTOLIC VOLUME: 111.55 ML
LEFT VENTRICLE MASS INDEX: 58 G/M2
LEFT VENTRICLE SYSTOLIC VOLUME INDEX: 16 ML/M2
LEFT VENTRICLE SYSTOLIC VOLUME: 29.62 ML
LEFT VENTRICULAR INTERNAL DIMENSION IN DIASTOLE: 4.88 CM (ref 3.5–6)
LEFT VENTRICULAR MASS: 107.03 G
LV LATERAL E/E' RATIO: 5.83 M/S
LV SEPTAL E/E' RATIO: 9.55 M/S
LVOT MG: 2.57 MMHG
LVOT MV: 0.77 CM/S
MV PEAK A VEL: 0.58 M/S
MV PEAK E VEL: 1.05 M/S
PISA MRMAX VEL: 4.58 M/S
PISA TR MAX VEL: 2.24 M/S
PULM VEIN S/D RATIO: 0.9
PV PEAK D VEL: 0.77 M/S
PV PEAK S VEL: 0.69 M/S
RA PRESSURE ESTIMATED: 8 MMHG
RA VOL SYS: 42.04 ML
RIGHT ATRIAL AREA: 14.7 CM2
RIGHT VENTRICULAR END-DIASTOLIC DIMENSION: 3.66 CM
RIGHT VENTRICULAR LENGTH IN DIASTOLE (APICAL 4-CHAMBER VIEW): 8.32 CM
RV MID DIAMA: 2.6 CM
RV TB RVSP: 10 MMHG
RV TISSUE DOPPLER FREE WALL SYSTOLIC VELOCITY 1 (APICAL 4 CHAMBER VIEW): 14.92 CM/S
SINUS: 2.75 CM
STJ: 2.51 CM
TDI LATERAL: 0.18 M/S
TDI SEPTAL: 0.11 M/S
TDI: 0.15 M/S
TR MAX PG: 20 MMHG
TRICUSPID ANNULAR PLANE SYSTOLIC EXCURSION: 2.56 CM
TV REST PULMONARY ARTERY PRESSURE: 28 MMHG
Z-SCORE OF LEFT VENTRICULAR DIMENSION IN END DIASTOLE: -0.52
Z-SCORE OF LEFT VENTRICULAR DIMENSION IN END SYSTOLE: -0.99

## 2023-12-27 ENCOUNTER — OFFICE VISIT (OUTPATIENT)
Dept: PODIATRY | Facility: CLINIC | Age: 28
End: 2023-12-27
Payer: COMMERCIAL

## 2023-12-27 VITALS — WEIGHT: 162 LBS | HEIGHT: 67 IN | BODY MASS INDEX: 25.43 KG/M2

## 2023-12-27 DIAGNOSIS — S91.101D OPEN WOUND OF RIGHT GREAT TOE, SUBSEQUENT ENCOUNTER: Primary | ICD-10-CM

## 2023-12-27 PROCEDURE — 1160F PR REVIEW ALL MEDS BY PRESCRIBER/CLIN PHARMACIST DOCUMENTED: ICD-10-PCS | Mod: CPTII,S$GLB,, | Performed by: STUDENT IN AN ORGANIZED HEALTH CARE EDUCATION/TRAINING PROGRAM

## 2023-12-27 PROCEDURE — 3008F PR BODY MASS INDEX (BMI) DOCUMENTED: ICD-10-PCS | Mod: CPTII,S$GLB,, | Performed by: STUDENT IN AN ORGANIZED HEALTH CARE EDUCATION/TRAINING PROGRAM

## 2023-12-27 PROCEDURE — 1160F RVW MEDS BY RX/DR IN RCRD: CPT | Mod: CPTII,S$GLB,, | Performed by: STUDENT IN AN ORGANIZED HEALTH CARE EDUCATION/TRAINING PROGRAM

## 2023-12-27 PROCEDURE — 99212 OFFICE O/P EST SF 10 MIN: CPT | Mod: S$GLB,,, | Performed by: STUDENT IN AN ORGANIZED HEALTH CARE EDUCATION/TRAINING PROGRAM

## 2023-12-27 PROCEDURE — 1159F MED LIST DOCD IN RCRD: CPT | Mod: CPTII,S$GLB,, | Performed by: STUDENT IN AN ORGANIZED HEALTH CARE EDUCATION/TRAINING PROGRAM

## 2023-12-27 PROCEDURE — 99999 PR PBB SHADOW E&M-EST. PATIENT-LVL III: CPT | Mod: PBBFAC,,, | Performed by: STUDENT IN AN ORGANIZED HEALTH CARE EDUCATION/TRAINING PROGRAM

## 2023-12-27 PROCEDURE — 3008F BODY MASS INDEX DOCD: CPT | Mod: CPTII,S$GLB,, | Performed by: STUDENT IN AN ORGANIZED HEALTH CARE EDUCATION/TRAINING PROGRAM

## 2023-12-27 PROCEDURE — 99999 PR PBB SHADOW E&M-EST. PATIENT-LVL III: ICD-10-PCS | Mod: PBBFAC,,, | Performed by: STUDENT IN AN ORGANIZED HEALTH CARE EDUCATION/TRAINING PROGRAM

## 2023-12-27 PROCEDURE — 1159F PR MEDICATION LIST DOCUMENTED IN MEDICAL RECORD: ICD-10-PCS | Mod: CPTII,S$GLB,, | Performed by: STUDENT IN AN ORGANIZED HEALTH CARE EDUCATION/TRAINING PROGRAM

## 2023-12-27 PROCEDURE — 99212 PR OFFICE/OUTPT VISIT, EST, LEVL II, 10-19 MIN: ICD-10-PCS | Mod: S$GLB,,, | Performed by: STUDENT IN AN ORGANIZED HEALTH CARE EDUCATION/TRAINING PROGRAM

## 2023-12-27 NOTE — PROGRESS NOTES
Subjective:      Patient ID: Claudia Sloan is a 28 y.o. female.    Chief Complaint: Wound Check    Ms. Sloan presents as a post op follow up from the hospital for a necrotic eschar formation of the right great toe following an ingrown toe nail procedure by another provider. She's had continued complications including two hospital stays. She currently has a PICC line and receiving rocephin. She has no pain and has been keeping the dressing dry. Surgical shoe has been hurting. She cannot fit the dressing into the boot. No pain when walking without an offloading shoe.     11/20/23: f/u wound R foot. Doing well overall. Some discomfort with walking, similar to a tens unit to the toe.     11/27/23: f/u R foot wound. Started having some pain a few days ago and it's improved some.     12/5/23: f/u R foot wound.    12/12/23: f/u R foot wound. Doing well overall.     12/27/23: f/u R foot wound. Doing well except for the pain w/ dressing changes. Feels like a burning for some time after the dressing change with medihoney.    Review of Systems   Skin:         Wound R foot   All other systems reviewed and are negative.          Objective:      Physical Exam  Cardiovascular:      Pulses:           Dorsalis pedis pulses are 2+ on the right side.        Posterior tibial pulses are 2+ on the right side.   Feet:      Right foot:      Protective Sensation: 5 sites tested.  5 sites sensed.      Comments: R great toe:  Medial great toe wound. Wound bed is leveled. Epithelialization beginning.               Assessment:       Encounter Diagnosis   Name Primary?    Open wound of right great toe, subsequent encounter Yes                   Plan:       Claudia was seen today for wound check.    Diagnoses and all orders for this visit:    Open wound of right great toe, subsequent encounter          I counseled the patient on her conditions, their implications and medical management.    Significant improvement in size of the wound vs last  visit.     Stop medihoney and start using neosporin with dressing changes. Ms. Sloan had no complaints with neosporin with today's dressing change.    F/u 3 weeks. Anticipate full healing by then    Nicola Ochoa DPM

## 2024-01-17 ENCOUNTER — OFFICE VISIT (OUTPATIENT)
Dept: PODIATRY | Facility: CLINIC | Age: 29
End: 2024-01-17
Payer: COMMERCIAL

## 2024-01-17 ENCOUNTER — PATIENT MESSAGE (OUTPATIENT)
Dept: PODIATRY | Facility: CLINIC | Age: 29
End: 2024-01-17

## 2024-01-17 VITALS — BODY MASS INDEX: 25.43 KG/M2 | HEIGHT: 67 IN | WEIGHT: 162 LBS

## 2024-01-17 DIAGNOSIS — Z87.828 HEALED WOUND: Primary | ICD-10-CM

## 2024-01-17 PROCEDURE — 99999 PR PBB SHADOW E&M-EST. PATIENT-LVL III: CPT | Mod: PBBFAC,,, | Performed by: STUDENT IN AN ORGANIZED HEALTH CARE EDUCATION/TRAINING PROGRAM

## 2024-01-17 PROCEDURE — 99213 OFFICE O/P EST LOW 20 MIN: CPT | Mod: S$GLB,,, | Performed by: STUDENT IN AN ORGANIZED HEALTH CARE EDUCATION/TRAINING PROGRAM

## 2024-01-17 NOTE — PROGRESS NOTES
Subjective:      Patient ID: Claudia Sloan is a 28 y.o. female.    Chief Complaint: Wound Check    Ms. Sloan presents as a post op follow up from the hospital for a necrotic eschar formation of the right great toe following an ingrown toe nail procedure by another provider. She's had continued complications including two hospital stays. She currently has a PICC line and receiving rocephin. She has no pain and has been keeping the dressing dry. Surgical shoe has been hurting. She cannot fit the dressing into the boot. No pain when walking without an offloading shoe.     11/20/23: f/u wound R foot. Doing well overall. Some discomfort with walking, similar to a tens unit to the toe.     11/27/23: f/u R foot wound. Started having some pain a few days ago and it's improved some.     12/5/23: f/u R foot wound.    12/12/23: f/u R foot wound. Doing well overall.     12/27/23: f/u R foot wound. Doing well except for the pain w/ dressing changes. Feels like a burning for some time after the dressing change with medihoney.    1/17/24: f/u R heel wound. Fully healed at this time. No more pain. Did notice a small blood blister which appears to be slowly resolving.     Review of Systems   Skin:         Resolving blood blister   All other systems reviewed and are negative.          Objective:      Physical Exam  Cardiovascular:      Pulses:           Dorsalis pedis pulses are 2+ on the right side.        Posterior tibial pulses are 2+ on the right side.   Feet:      Right foot:      Protective Sensation: 5 sites tested.  5 sites sensed.      Comments: R great toe:  Medial great toe wound is fully epithelialized. Small resolving hematoma. No signs of infection. No pain.               Assessment:       Encounter Diagnosis   Name Primary?    Healed wound Yes                     Plan:       Claudia was seen today for wound check.    Diagnoses and all orders for this visit:    Healed wound            I counseled the patient on her  conditions, their implications and medical management.    Wound is healed at this time. Monitor the ecchymosis for resolution or recurrence. Start to lotion the skin at this time.    Nicola Ochoa DPM

## 2024-04-05 PROBLEM — R10.9 RIGHT SIDED ABDOMINAL PAIN: Status: ACTIVE | Noted: 2024-04-05

## 2024-04-05 PROBLEM — O36.8130 DECREASED FETUS MOVEMENTS AFFECTING MANAGEMENT OF MOTHER IN THIRD TRIMESTER: Status: ACTIVE | Noted: 2021-01-17

## 2024-07-08 PROBLEM — O36.8130 DECREASED FETUS MOVEMENTS AFFECTING MANAGEMENT OF MOTHER IN THIRD TRIMESTER: Status: RESOLVED | Noted: 2021-01-17 | Resolved: 2024-07-08

## 2024-08-12 PROBLEM — Z34.91 FIRST TRIMESTER PREGNANCY: Status: RESOLVED | Noted: 2023-11-06 | Resolved: 2024-08-12

## 2024-08-12 PROBLEM — Z87.59 HISTORY OF ECTOPIC PREGNANCY: Status: ACTIVE | Noted: 2024-08-12

## 2024-08-12 PROBLEM — Z34.90 PREGNANCY: Status: RESOLVED | Noted: 2023-10-30 | Resolved: 2024-08-12

## 2024-08-12 PROBLEM — E87.6 HYPOKALEMIA: Status: RESOLVED | Noted: 2023-11-06 | Resolved: 2024-08-12

## 2024-08-12 PROBLEM — O00.90 ECTOPIC PREGNANCY: Status: RESOLVED | Noted: 2022-12-13 | Resolved: 2024-08-12

## 2024-08-12 PROBLEM — R06.02 SOB (SHORTNESS OF BREATH): Status: RESOLVED | Noted: 2023-12-08 | Resolved: 2024-08-12

## 2024-08-12 PROBLEM — F41.9 ANXIETY: Status: ACTIVE | Noted: 2024-08-12

## 2024-08-12 PROBLEM — Z98.890 HISTORY OF LOOP ELECTROSURGICAL EXCISION PROCEDURE (LEEP): Status: ACTIVE | Noted: 2024-08-12

## 2024-08-12 PROBLEM — L03.031 CELLULITIS OF GREAT TOE OF RIGHT FOOT: Status: RESOLVED | Noted: 2023-10-30 | Resolved: 2024-08-12

## 2024-08-12 PROBLEM — R10.9 RIGHT SIDED ABDOMINAL PAIN: Status: RESOLVED | Noted: 2024-04-05 | Resolved: 2024-08-12

## 2024-08-12 PROBLEM — S91.101A OPEN WOUND OF RIGHT GREAT TOE: Status: RESOLVED | Noted: 2023-11-06 | Resolved: 2024-08-12

## 2024-08-12 PROBLEM — R60.9 FLUID RETENTION: Status: RESOLVED | Noted: 2023-12-08 | Resolved: 2024-08-12

## 2024-08-12 PROBLEM — Z82.49 FAMILY HISTORY OF PULMONARY EMBOLISM: Status: ACTIVE | Noted: 2024-02-07

## 2024-08-12 PROBLEM — N92.6 IRREGULAR PERIODS: Status: RESOLVED | Noted: 2022-07-01 | Resolved: 2024-08-12

## 2024-09-20 ENCOUNTER — OFFICE VISIT (OUTPATIENT)
Dept: PAIN MEDICINE | Facility: CLINIC | Age: 29
End: 2024-09-20
Payer: COMMERCIAL

## 2024-09-20 VITALS
BODY MASS INDEX: 25.97 KG/M2 | HEART RATE: 84 BPM | DIASTOLIC BLOOD PRESSURE: 67 MMHG | HEIGHT: 67 IN | SYSTOLIC BLOOD PRESSURE: 112 MMHG | WEIGHT: 165.44 LBS

## 2024-09-20 DIAGNOSIS — M54.2 CERVICALGIA: ICD-10-CM

## 2024-09-20 DIAGNOSIS — M79.18 MYOFASCIAL PAIN: Primary | ICD-10-CM

## 2024-09-20 PROCEDURE — 99999 PR PBB SHADOW E&M-EST. PATIENT-LVL IV: CPT | Mod: PBBFAC,,, | Performed by: ANESTHESIOLOGY

## 2024-09-20 RX ORDER — TRIAMCINOLONE ACETONIDE 40 MG/ML
40 INJECTION, SUSPENSION INTRA-ARTICULAR; INTRAMUSCULAR
Status: COMPLETED | OUTPATIENT
Start: 2024-09-20 | End: 2024-09-20

## 2024-09-20 RX ADMIN — TRIAMCINOLONE ACETONIDE 40 MG: 40 INJECTION, SUSPENSION INTRA-ARTICULAR; INTRAMUSCULAR at 02:09

## 2024-09-20 NOTE — PROGRESS NOTES
[facility] New Patient Evaluation      Referred by: Yamilet Harrell    PCP:     CC:   Chief Complaint   Patient presents with    Neck Pain    Back Pain    Shoulder Pain          9/20/2024     1:48 PM   Last 3 PDI Scores   Pain Disability Index (PDI) 34         HPI:   Claudia Sloan is a 29 y.o. female patient who has a past medical history of Abnormal Pap smear of cervix, Anemia, Anxiety and depression, Cellulitis of great toe of right foot, Chronic constipation, Chronic tension type headache, Complication of anesthesia, Former smoker, Hypothyroidism, Ingrown toenail, Other ectopic pregnancy without intrauterine pregnancy, and Seasonal allergies. She presents with neck pain.  Specifically she has pain towards the left side of her neck and the the left shoulder around the trapezius area.  Her pain has been bothering her for the past 2 weeks.  It is 5/10, constant, dull, burning, tight.  Some pain can radiate up the back of the head pain is worse with sitting, lifting, touching, driving and she finds some mild relief with tizanidine, ibuprofen.  No radicular pain.  No numbness or weakness.      Pain Intervention History:      Past Spine Surgical History:      Past and current medications:  Antineuropathics:  NSAIDs:  Physical therapy:  Antidepressants:  Muscle relaxers: tizanidine   Opioids:  Antiplatelets/Anticoagulants:    History:    Current Outpatient Medications:     acetaminophen (TYLENOL) 325 MG tablet, Take 2 tablets (650 mg total) by mouth every 6 (six) hours as needed for Pain., Disp: , Rfl: 0    butalbital-acetaminophen-caff -40 mg Cap, Take 1 capsule every 4 hours by oral route., Disp: , Rfl:     ondansetron (ZOFRAN-ODT) 8 MG TbDL, 1 po bid prn nausea, Disp: , Rfl:     phentermine (ADIPEX-P) 37.5 mg tablet, Take 37.5 mg by mouth., Disp: , Rfl:     PNV,calcium 72/iron/folic acid (PRENATAL VITAMIN) Tab, Take 1 tablet by mouth once daily., Disp: , Rfl:     tirzepatide (MOUNJARO) 2.5 mg/0.5  mL PnIj, Inject by subcutaneous route., Disp: , Rfl:     tiZANidine (ZANAFLEX) 4 MG tablet, Take 1 tablet (4 mg total) by mouth every evening., Disp: 90 tablet, Rfl: 1    WELLBUTRIN  mg TB24 tablet, 1 po q day for 2 to 4 weeks then increase to 2 po q day, Disp: , Rfl:     Past Medical History:   Diagnosis Date    Abnormal Pap smear of cervix     LEEP    Anemia     Anxiety and depression     Cellulitis of great toe of right foot     Chronic constipation     Chronic tension type headache     Complication of anesthesia     Former smoker     Hypothyroidism     associated with preganancy, resolved    Ingrown toenail     Other ectopic pregnancy without intrauterine pregnancy 04/20/2018    Seasonal allergies        Past Surgical History:   Procedure Laterality Date    APPLICATION, GRAFT Right 11/8/2023    Procedure: APPLICATION OF MESHED BILAYER WOUIND MATRIX;  Surgeon: Nicola Ochoa DPM;  Location: [facility];  Service: Podiatry;  Laterality: Right;    BONE BIOPSY Right 11/8/2023    Procedure: Biopsy-Bone;  Surgeon: Nicola Ochoa DPM;  Location: [facility];  Service: Podiatry;  Laterality: Right;    breast reduction Bilateral     BREAST SURGERY Bilateral 2014    breast reduction    DEBRIDEMENT, PHALANX, FOOT Right 11/8/2023    Procedure: DEBRIDEMENT, RIGHT FIRST TOE;  Surgeon: Nicola Ochoa DPM;  Location: [facility];  Service: Podiatry;  Laterality: Right;    ESOPHAGOGASTRODUODENOSCOPY      FOOT SURGERY      phenol matrixectomy    LYMPH NODE BIOPSY Left 05/01/2016    benign    TONSILLECTOMY  01/24/2018       Family History   Problem Relation Name Age of Onset    ADD / ADHD Mother      Chiari malformation Mother      ADD / ADHD Brother      Stroke Maternal Grandmother      Diabetes Maternal Grandfather      Colon polyps Maternal Grandfather          in his 50's    Esophageal cancer Maternal Grandfather      Prostate cancer Maternal Grandfather      Diabetes Paternal Grandmother      Glaucoma Neg Hx    "      Social History     Socioeconomic History    Marital status:     Number of children: 0   Occupational History    Occupation:    Tobacco Use    Smoking status: Former     Current packs/day: 0.00     Types: Cigarettes     Quit date: 2015     Years since quittin.7    Smokeless tobacco: Never    Tobacco comments:     Socially smoked in high school   Substance and Sexual Activity    Alcohol use: No     Alcohol/week: 0.0 standard drinks of alcohol    Drug use: No    Sexual activity: Yes     Partners: Male     Social Determinants of Health     Financial Resource Strain: Low Risk  (2024)    Overall Financial Resource Strain (CARDIA)     Difficulty of Paying Living Expenses: Not hard at all   Food Insecurity: No Food Insecurity (2024)    Hunger Vital Sign     Worried About Running Out of Food in the Last Year: Never true     Ran Out of Food in the Last Year: Never true   Transportation Needs: No Transportation Needs (2023)    PRAPARE - Transportation     Lack of Transportation (Medical): No     Lack of Transportation (Non-Medical): No   Physical Activity: Insufficiently Active (2024)    Exercise Vital Sign     Days of Exercise per Week: 2 days     Minutes of Exercise per Session: 30 min   Stress: No Stress Concern Present (2024)    [employer] - Occupational Stress Questionnaire     Feeling of Stress : Not at all   Housing Stability: Low Risk  (2023)    Housing Stability Vital Sign     Unable to Pay for Housing in the Last Year: No     Number of Places Lived in the Last Year: 1     Unstable Housing in the Last Year: No       Review of patient's allergies indicates:   Allergen Reactions    Rocuronium     Succinylcholine Other (See Comments)       Review of Systems:  12 point review of systems is negative.    Physical Exam:  Vitals:    24 1349   BP: 112/67   Pulse: 84   Weight: 75 kg (165 lb 7.3 oz)   Height: 5' 7" " (1.702 m)   PainSc:   5   PainLoc: Neck     Body mass index is 25.91 kg/m².    Gen: NAD  Psych: mood appropriate for given condition  HEENT: eyes anicteric   CV: RRR  HEENT: anicteric   Respiratory: non-labored, no signs of respiratory distress  Abd: non-distended  Skin: warm, dry and intact.  Gait: No antalgic gait.     Tenderness and trigger point over the left trapezius    Sensory:  Intact and symmetrical to light touch in C4-T1 dermatomes bilaterally.     Motor:    Right Left   C4 Shoulder Abduction  5  5   C5 Elbow Flexion    5  5   C6 Wrist Extension  5  5   C7 Elbow Extension   5  5   C8/T1 Hand Intrinsics   5  5      Right Left   Triceps DTR 0 2+   Biceps DTR 2+ 2+        Patellar DTR 2+ 2+        River Absent  Absent                 Labs:  Lab Results   Component Value Date    HGBA1C 4.5 10/14/2022       Lab Results   Component Value Date    WBC 5.60 08/13/2024    HGB 14.8 08/13/2024    HCT 43.4 08/13/2024    MCV 87 08/13/2024     08/13/2024           Imaging:  MRI lumbar spine 10/29/19  FINDINGS:  Vertebral bodies are normal in height and alignment.  No osseous edema or acute fracture.  Disc heights are maintained.     Spinal cord is normal in caliber and signal.     There is no disc bulge, spinal canal or neural foraminal narrowing at any level.     Paravertebral soft tissues are normal.    Assessment:   Problem List Items Addressed This Visit    None  Visit Diagnoses       Myofascial pain    -  Primary    Relevant Orders    Ambulatory referral/consult to Physical/Occupational Therapy    Cervicalgia        Relevant Orders    Ambulatory referral/consult to Physical/Occupational Therapy              Claudia Sloan is a 29 y.o. female patient who has a past medical history of Abnormal Pap smear of cervix, Anemia, Anxiety and depression, Cellulitis of great toe of right foot, Chronic constipation, Chronic tension type headache, Complication of anesthesia, Former smoker, Hypothyroidism, Ingrown  toenail, Other ectopic pregnancy without intrauterine pregnancy, and Seasonal allergies. She presents with  neck pain.  Specifically she has pain towards the left side of her neck and the the left shoulder around the trapezius area.  Her pain has been bothering her for the past 2 weeks.  It is 5/10, constant, dull, burning, tight.  Some pain can radiate up the back of the head pain is worse with sitting, lifting, touching, driving and she finds some mild relief with tizanidine, ibuprofen.  No radicular pain.  No numbness or weakness.    - on exam she has full strength of her upper extremities intact sensation to light touch bilateral C4-T1.  She has tenderness and trigger point over the left trapezius  - I independently reviewed her previous lumbar MRI from October 2019 and there was no significant central foraminal narrowing at any level  - I suspect she primarily has myofascial pain.  She has a young child that she cares for and I think this also may be contributing to some of her pain in terms of positioning with holding him.  - we are going to do trigger points in the left trapezius today in the office.  I have also recommended a full course of formal PT and dry needling and placed a referral for her to do this at [facility].  I also recommended massage therapy  - she will follow up with me on an as needed basis      : Not applicable    Jamie Cintron M.D.  Interventional Pain Medicine / Anesthesiology    This note was completed with dictation software and grammatical errors may exist.    Trigger Point Injection    This is a pain clinic procedure note.    Procedure: Trigger point injection  Procedure Date: 09/20/2024  Muscles Injected: left trapezius   Subjective: Multiple trigger points and areas of tenderness were identified and marked.  Preoperative Diagnosis: Myofascial Pain   Post Procedure Diagnosis: Myofascial Pain   Findings: Radiating trigger points  Complications: None  Anesthetic: 50:50 Mixture of  Lidocaine 2% and Bupivacaine 0.5% + 40mg kenalog    Procedure details: Skin overlying target injection sites cleaned with alcohol swabs.  Each identified trigger point was injected with the aforementioned anesthetic using a 1.25 inch 25G needle followed by needling technique.  Twitch response was elicited at some sites.    Total injections: 3    Dispo: no complications, pt tolerated the procedure well.

## 2024-11-07 ENCOUNTER — OFFICE VISIT (OUTPATIENT)
Dept: CARDIOLOGY | Facility: CLINIC | Age: 29
End: 2024-11-07
Payer: COMMERCIAL

## 2024-11-07 VITALS
BODY MASS INDEX: 21.82 KG/M2 | WEIGHT: 139 LBS | SYSTOLIC BLOOD PRESSURE: 116 MMHG | HEART RATE: 79 BPM | HEIGHT: 67 IN | DIASTOLIC BLOOD PRESSURE: 76 MMHG

## 2024-11-07 DIAGNOSIS — Z82.49 FAMILY HISTORY OF PULMONARY EMBOLISM: Chronic | ICD-10-CM

## 2024-11-07 DIAGNOSIS — E03.9 ACQUIRED HYPOTHYROIDISM: Chronic | ICD-10-CM

## 2024-11-07 DIAGNOSIS — R07.2 PRECORDIAL PAIN: Primary | ICD-10-CM

## 2024-11-07 PROCEDURE — 99213 OFFICE O/P EST LOW 20 MIN: CPT | Mod: S$GLB,,, | Performed by: INTERNAL MEDICINE

## 2024-11-07 PROCEDURE — 1160F RVW MEDS BY RX/DR IN RCRD: CPT | Mod: CPTII,S$GLB,, | Performed by: INTERNAL MEDICINE

## 2024-11-07 PROCEDURE — 3008F BODY MASS INDEX DOCD: CPT | Mod: CPTII,S$GLB,, | Performed by: INTERNAL MEDICINE

## 2024-11-07 PROCEDURE — 3078F DIAST BP <80 MM HG: CPT | Mod: CPTII,S$GLB,, | Performed by: INTERNAL MEDICINE

## 2024-11-07 PROCEDURE — 1159F MED LIST DOCD IN RCRD: CPT | Mod: CPTII,S$GLB,, | Performed by: INTERNAL MEDICINE

## 2024-11-07 PROCEDURE — 99999 PR PBB SHADOW E&M-EST. PATIENT-LVL III: CPT | Mod: PBBFAC,,, | Performed by: INTERNAL MEDICINE

## 2024-11-07 PROCEDURE — 3074F SYST BP LT 130 MM HG: CPT | Mod: CPTII,S$GLB,, | Performed by: INTERNAL MEDICINE

## 2024-11-11 ENCOUNTER — HOSPITAL ENCOUNTER (OUTPATIENT)
Dept: CARDIOLOGY | Facility: HOSPITAL | Age: 29
Discharge: HOME | End: 2024-11-11
Attending: INTERNAL MEDICINE
Payer: COMMERCIAL

## 2024-11-11 VITALS — HEIGHT: 67 IN | BODY MASS INDEX: 21.82 KG/M2 | WEIGHT: 139 LBS

## 2024-11-11 DIAGNOSIS — R07.2 PRECORDIAL PAIN: ICD-10-CM

## 2024-11-11 LAB
CV STRESS BASE HR: 92 BPM
DIASTOLIC BLOOD PRESSURE: 78 MMHG
OHS CV CPX 1 MINUTE RECOVERY HEART RATE: 137 BPM
OHS CV CPX 85 PERCENT MAX PREDICTED HEART RATE MALE: 162
OHS CV CPX ESTIMATED METS: 14
OHS CV CPX MAX PREDICTED HEART RATE: 191
OHS CV CPX PATIENT IS FEMALE: 1
OHS CV CPX PATIENT IS MALE: 0
OHS CV CPX PEAK DIASTOLIC BLOOD PRESSURE: 74 MMHG
OHS CV CPX PEAK HEAR RATE: 164 BPM
OHS CV CPX PEAK RATE PRESSURE PRODUCT: NORMAL
OHS CV CPX PEAK SYSTOLIC BLOOD PRESSURE: 138 MMHG
OHS CV CPX PERCENT MAX PREDICTED HEART RATE ACHIEVED: 91
OHS CV CPX RATE PRESSURE PRODUCT PRESENTING: 9752
STRESS ECHO POST EXERCISE DUR MIN: 7 MINUTES
STRESS ECHO POST EXERCISE DUR SEC: 26 SECONDS
SYSTOLIC BLOOD PRESSURE: 106 MMHG

## 2024-11-11 PROCEDURE — 93018 CV STRESS TEST I&R ONLY: CPT | Mod: ,,, | Performed by: INTERNAL MEDICINE

## 2024-11-11 PROCEDURE — 93017 CV STRESS TEST TRACING ONLY: CPT | Mod: PO

## 2024-11-11 PROCEDURE — 93016 CV STRESS TEST SUPVJ ONLY: CPT | Mod: ,,, | Performed by: INTERNAL MEDICINE

## 2024-11-22 ENCOUNTER — PATIENT MESSAGE (OUTPATIENT)
Dept: PODIATRY | Facility: CLINIC | Age: 29
End: 2024-11-22
Payer: COMMERCIAL

## 2024-12-12 PROBLEM — R07.2 PRECORDIAL PAIN: Status: RESOLVED | Noted: 2024-11-07 | Resolved: 2024-12-12

## 2024-12-12 PROBLEM — Z86.39 HISTORY OF HYPOTHYROIDISM: Status: RESOLVED | Noted: 2023-11-06 | Resolved: 2024-12-12

## 2024-12-12 PROBLEM — Z34.90 ENCOUNTER FOR ELECTIVE INDUCTION OF LABOR: Status: RESOLVED | Noted: 2021-03-11 | Resolved: 2024-12-12

## 2024-12-17 ENCOUNTER — PATIENT MESSAGE (OUTPATIENT)
Dept: PSYCHIATRY | Facility: CLINIC | Age: 29
End: 2024-12-17
Payer: COMMERCIAL

## 2025-01-24 ENCOUNTER — PATIENT MESSAGE (OUTPATIENT)
Dept: PSYCHIATRY | Facility: CLINIC | Age: 30
End: 2025-01-24
Payer: COMMERCIAL

## 2025-01-24 ENCOUNTER — TELEPHONE (OUTPATIENT)
Dept: PSYCHIATRY | Facility: CLINIC | Age: 30
End: 2025-01-24
Payer: COMMERCIAL

## 2025-02-06 ENCOUNTER — TELEPHONE (OUTPATIENT)
Dept: PSYCHIATRY | Facility: CLINIC | Age: 30
End: 2025-02-06
Payer: COMMERCIAL

## 2025-02-11 ENCOUNTER — TELEPHONE (OUTPATIENT)
Dept: PSYCHIATRY | Facility: CLINIC | Age: 30
End: 2025-02-11
Payer: COMMERCIAL

## 2025-02-25 ENCOUNTER — PATIENT MESSAGE (OUTPATIENT)
Dept: FAMILY MEDICINE | Facility: CLINIC | Age: 30
End: 2025-02-25
Payer: COMMERCIAL

## 2025-04-10 ENCOUNTER — TELEPHONE (OUTPATIENT)
Dept: DERMATOLOGY | Facility: CLINIC | Age: 30
End: 2025-04-10
Payer: COMMERCIAL

## 2025-06-10 PROBLEM — J18.9 LLL PNEUMONIA: Status: ACTIVE | Noted: 2025-06-10

## 2025-06-11 PROBLEM — D64.9 ANEMIA: Status: ACTIVE | Noted: 2025-06-11

## 2025-06-11 PROBLEM — D70.3 NEUTROPENIA ASSOCIATED WITH INFECTION: Status: ACTIVE | Noted: 2025-06-11

## 2025-06-12 PROBLEM — D70.8 OTHER NEUTROPENIA: Status: ACTIVE | Noted: 2025-06-11

## 2025-06-16 ENCOUNTER — TELEPHONE (OUTPATIENT)
Dept: HEMATOLOGY/ONCOLOGY | Facility: CLINIC | Age: 30
End: 2025-06-16
Payer: COMMERCIAL

## 2025-06-16 NOTE — TELEPHONE ENCOUNTER
Copied from CRM #[id number]. Topic: General Inquiry - Patient Advice  >> Jun 16, 2025 10:10 AM Graciela wrote:  Type: Needs Medical Advice  Who Called:  pt   Symptoms (please be specific):    How long has patient had these symptoms:    Pharmacy name and phone #:    Best Call Back Number: [phone]  Additional Information: pt is requesting a call back to schedule a NP appt with Dr Collins. She says the appt is regarding Neuproeenia  Says she has a personal referral.